# Patient Record
Sex: MALE | Race: WHITE | Employment: OTHER | URBAN - METROPOLITAN AREA
[De-identification: names, ages, dates, MRNs, and addresses within clinical notes are randomized per-mention and may not be internally consistent; named-entity substitution may affect disease eponyms.]

---

## 2017-01-06 RX ORDER — HYDROCODONE BITARTRATE AND ACETAMINOPHEN 5; 325 MG/1; MG/1
1 TABLET ORAL 2 TIMES DAILY
Qty: 60 TABLET | Refills: 0 | Status: SHIPPED | OUTPATIENT
Start: 2017-01-06 | End: 2017-02-10 | Stop reason: SDUPTHER

## 2017-02-09 ENCOUNTER — PATIENT MESSAGE (OUTPATIENT)
Dept: FAMILY MEDICINE CLINIC | Age: 57
End: 2017-02-09

## 2017-02-10 ENCOUNTER — TELEPHONE (OUTPATIENT)
Dept: FAMILY MEDICINE CLINIC | Age: 57
End: 2017-02-10

## 2017-02-10 RX ORDER — HYDROCODONE BITARTRATE AND ACETAMINOPHEN 5; 325 MG/1; MG/1
1 TABLET ORAL 2 TIMES DAILY
Qty: 60 TABLET | Refills: 0 | Status: SHIPPED | OUTPATIENT
Start: 2017-02-10 | End: 2017-03-28 | Stop reason: SDUPTHER

## 2017-03-28 RX ORDER — HYDROCODONE BITARTRATE AND ACETAMINOPHEN 5; 325 MG/1; MG/1
1 TABLET ORAL 2 TIMES DAILY
Qty: 60 TABLET | Refills: 0 | Status: SHIPPED | OUTPATIENT
Start: 2017-03-28 | End: 2017-06-05 | Stop reason: SDUPTHER

## 2017-06-05 ENCOUNTER — OFFICE VISIT (OUTPATIENT)
Dept: FAMILY MEDICINE CLINIC | Age: 57
End: 2017-06-05

## 2017-06-05 VITALS
BODY MASS INDEX: 30.38 KG/M2 | HEART RATE: 100 BPM | WEIGHT: 194 LBS | DIASTOLIC BLOOD PRESSURE: 82 MMHG | SYSTOLIC BLOOD PRESSURE: 150 MMHG

## 2017-06-05 DIAGNOSIS — I10 ESSENTIAL HYPERTENSION: Primary | ICD-10-CM

## 2017-06-05 DIAGNOSIS — M50.30 DDD (DEGENERATIVE DISC DISEASE), CERVICAL: ICD-10-CM

## 2017-06-05 DIAGNOSIS — M51.37 DDD (DEGENERATIVE DISC DISEASE), LUMBOSACRAL: ICD-10-CM

## 2017-06-05 PROCEDURE — 99214 OFFICE O/P EST MOD 30 MIN: CPT | Performed by: FAMILY MEDICINE

## 2017-06-05 RX ORDER — AMLODIPINE BESYLATE 10 MG/1
10 TABLET ORAL DAILY
Qty: 30 TABLET | Refills: 1 | Status: SHIPPED | OUTPATIENT
Start: 2017-06-05 | End: 2017-07-07 | Stop reason: SDUPTHER

## 2017-06-05 RX ORDER — HYDROCODONE BITARTRATE AND ACETAMINOPHEN 5; 325 MG/1; MG/1
1 TABLET ORAL 2 TIMES DAILY
Qty: 60 TABLET | Refills: 0 | Status: SHIPPED | OUTPATIENT
Start: 2017-06-05 | End: 2017-07-07 | Stop reason: SDUPTHER

## 2017-06-05 ASSESSMENT — ENCOUNTER SYMPTOMS
BACK PAIN: 1
GASTROINTESTINAL NEGATIVE: 1
ALLERGIC/IMMUNOLOGIC NEGATIVE: 1
EYES NEGATIVE: 1

## 2017-07-07 ENCOUNTER — OFFICE VISIT (OUTPATIENT)
Dept: FAMILY MEDICINE CLINIC | Age: 57
End: 2017-07-07

## 2017-07-07 VITALS
WEIGHT: 196.6 LBS | SYSTOLIC BLOOD PRESSURE: 130 MMHG | HEART RATE: 96 BPM | DIASTOLIC BLOOD PRESSURE: 72 MMHG | BODY MASS INDEX: 30.79 KG/M2

## 2017-07-07 DIAGNOSIS — M50.30 DDD (DEGENERATIVE DISC DISEASE), CERVICAL: ICD-10-CM

## 2017-07-07 DIAGNOSIS — M51.37 DDD (DEGENERATIVE DISC DISEASE), LUMBOSACRAL: ICD-10-CM

## 2017-07-07 DIAGNOSIS — I10 ESSENTIAL HYPERTENSION: ICD-10-CM

## 2017-07-07 PROCEDURE — 99213 OFFICE O/P EST LOW 20 MIN: CPT | Performed by: FAMILY MEDICINE

## 2017-07-07 RX ORDER — AMLODIPINE BESYLATE 10 MG/1
10 TABLET ORAL DAILY
Qty: 30 TABLET | Refills: 2 | Status: SHIPPED | OUTPATIENT
Start: 2017-07-07 | End: 2017-11-14 | Stop reason: SDUPTHER

## 2017-07-07 RX ORDER — HYDROCODONE BITARTRATE AND ACETAMINOPHEN 5; 325 MG/1; MG/1
1 TABLET ORAL 2 TIMES DAILY
Qty: 60 TABLET | Refills: 0 | Status: SHIPPED | OUTPATIENT
Start: 2017-07-07 | End: 2017-08-11 | Stop reason: SDUPTHER

## 2017-07-07 ASSESSMENT — PATIENT HEALTH QUESTIONNAIRE - PHQ9
1. LITTLE INTEREST OR PLEASURE IN DOING THINGS: 1
SUM OF ALL RESPONSES TO PHQ9 QUESTIONS 1 & 2: 1
2. FEELING DOWN, DEPRESSED OR HOPELESS: 0
SUM OF ALL RESPONSES TO PHQ QUESTIONS 1-9: 1

## 2017-07-07 ASSESSMENT — ENCOUNTER SYMPTOMS
BACK PAIN: 1
EYES NEGATIVE: 1
ALLERGIC/IMMUNOLOGIC NEGATIVE: 1
RESPIRATORY NEGATIVE: 1

## 2017-08-11 DIAGNOSIS — M51.37 DDD (DEGENERATIVE DISC DISEASE), LUMBOSACRAL: ICD-10-CM

## 2017-08-11 DIAGNOSIS — M50.30 DDD (DEGENERATIVE DISC DISEASE), CERVICAL: ICD-10-CM

## 2017-08-11 RX ORDER — HYDROCODONE BITARTRATE AND ACETAMINOPHEN 5; 325 MG/1; MG/1
1 TABLET ORAL 2 TIMES DAILY
Qty: 60 TABLET | Refills: 0 | Status: CANCELLED | OUTPATIENT
Start: 2017-08-11

## 2017-08-11 RX ORDER — HYDROCODONE BITARTRATE AND ACETAMINOPHEN 5; 325 MG/1; MG/1
1 TABLET ORAL 2 TIMES DAILY
Qty: 60 TABLET | Refills: 0 | Status: SHIPPED | OUTPATIENT
Start: 2017-08-11 | End: 2017-10-04 | Stop reason: SDUPTHER

## 2017-10-04 DIAGNOSIS — M50.30 DDD (DEGENERATIVE DISC DISEASE), CERVICAL: ICD-10-CM

## 2017-10-04 DIAGNOSIS — M51.37 DDD (DEGENERATIVE DISC DISEASE), LUMBOSACRAL: ICD-10-CM

## 2017-10-04 RX ORDER — HYDROCODONE BITARTRATE AND ACETAMINOPHEN 5; 325 MG/1; MG/1
1 TABLET ORAL 2 TIMES DAILY
Qty: 60 TABLET | Refills: 0 | Status: SHIPPED | OUTPATIENT
Start: 2017-10-04 | End: 2017-11-13 | Stop reason: SDUPTHER

## 2017-10-04 RX ORDER — HYDROCODONE BITARTRATE AND ACETAMINOPHEN 5; 325 MG/1; MG/1
1 TABLET ORAL 2 TIMES DAILY
Qty: 60 TABLET | Refills: 0 | OUTPATIENT
Start: 2017-10-04

## 2017-10-06 DIAGNOSIS — M51.37 DDD (DEGENERATIVE DISC DISEASE), LUMBOSACRAL: ICD-10-CM

## 2017-10-06 DIAGNOSIS — M50.30 DDD (DEGENERATIVE DISC DISEASE), CERVICAL: ICD-10-CM

## 2017-10-06 RX ORDER — HYDROCODONE BITARTRATE AND ACETAMINOPHEN 5; 325 MG/1; MG/1
1 TABLET ORAL 2 TIMES DAILY
Qty: 60 TABLET | Refills: 0 | OUTPATIENT
Start: 2017-10-06

## 2017-10-06 NOTE — TELEPHONE ENCOUNTER
From: Poornima Neville  To: Wendy Tate MD  Sent: 10/6/2017 10:42 AM EDT  Subject: Medication Renewal Request    Original authorizing provider: MD Gladis Kraft. Vannessa Alvarez would like a refill of the following medications:  HYDROcodone-acetaminophen ([address]) 5-325 MG per tablet Octavia Fuchs MD]    Preferred pharmacy: [address] [phone] - F [phone]    Comment:  I did not refill this Medication last month so why is it denied? I know I have an office visit on the 13th with Dr. Kalen Lovelace but I am out of my Medication now.

## 2017-10-13 ENCOUNTER — OFFICE VISIT (OUTPATIENT)
Dept: FAMILY MEDICINE CLINIC | Age: 57
End: 2017-10-13

## 2017-10-13 VITALS
BODY MASS INDEX: 30.98 KG/M2 | WEIGHT: 197.8 LBS | SYSTOLIC BLOOD PRESSURE: 134 MMHG | OXYGEN SATURATION: 99 % | DIASTOLIC BLOOD PRESSURE: 70 MMHG | HEART RATE: 96 BPM

## 2017-10-13 DIAGNOSIS — I10 ESSENTIAL HYPERTENSION: ICD-10-CM

## 2017-10-13 DIAGNOSIS — Z13.1 DIABETES MELLITUS SCREENING: ICD-10-CM

## 2017-10-13 DIAGNOSIS — G47.33 OBSTRUCTIVE SLEEP APNEA SYNDROME: ICD-10-CM

## 2017-10-13 DIAGNOSIS — I25.10 CORONARY ARTERY CALCIFICATION: ICD-10-CM

## 2017-10-13 DIAGNOSIS — E55.9 VITAMIN D DEFICIENCY: ICD-10-CM

## 2017-10-13 DIAGNOSIS — Z12.11 SCREENING FOR COLON CANCER: ICD-10-CM

## 2017-10-13 DIAGNOSIS — R73.9 HYPERGLYCEMIA: ICD-10-CM

## 2017-10-13 DIAGNOSIS — M50.30 DDD (DEGENERATIVE DISC DISEASE), CERVICAL: ICD-10-CM

## 2017-10-13 DIAGNOSIS — M45.7 ANKYLOSING SPONDYLITIS OF LUMBOSACRAL REGION (HCC): Primary | ICD-10-CM

## 2017-10-13 DIAGNOSIS — Z23 NEED FOR INFLUENZA VACCINATION: ICD-10-CM

## 2017-10-13 DIAGNOSIS — I25.84 CORONARY ARTERY CALCIFICATION: ICD-10-CM

## 2017-10-13 DIAGNOSIS — Z72.0 TOBACCO ABUSE: ICD-10-CM

## 2017-10-13 DIAGNOSIS — M51.37 DDD (DEGENERATIVE DISC DISEASE), LUMBOSACRAL: ICD-10-CM

## 2017-10-13 DIAGNOSIS — Z13.220 LIPID SCREENING: ICD-10-CM

## 2017-10-13 DIAGNOSIS — R63.5 WEIGHT GAIN: ICD-10-CM

## 2017-10-13 PROCEDURE — 4004F PT TOBACCO SCREEN RCVD TLK: CPT | Performed by: FAMILY MEDICINE

## 2017-10-13 PROCEDURE — G8417 CALC BMI ABV UP PARAM F/U: HCPCS | Performed by: FAMILY MEDICINE

## 2017-10-13 PROCEDURE — 3017F COLORECTAL CA SCREEN DOC REV: CPT | Performed by: FAMILY MEDICINE

## 2017-10-13 PROCEDURE — G8484 FLU IMMUNIZE NO ADMIN: HCPCS | Performed by: FAMILY MEDICINE

## 2017-10-13 PROCEDURE — G0008 ADMIN INFLUENZA VIRUS VAC: HCPCS | Performed by: FAMILY MEDICINE

## 2017-10-13 PROCEDURE — G8427 DOCREV CUR MEDS BY ELIG CLIN: HCPCS | Performed by: FAMILY MEDICINE

## 2017-10-13 PROCEDURE — 99214 OFFICE O/P EST MOD 30 MIN: CPT | Performed by: FAMILY MEDICINE

## 2017-10-13 PROCEDURE — 90630 INFLUENZA, QUADV, 18-64 YRS, ID, PF, MICRO INJ, 0.1ML (FLUZONE QUADV, PF): CPT | Performed by: FAMILY MEDICINE

## 2017-10-13 PROCEDURE — G8598 ASA/ANTIPLAT THER USED: HCPCS | Performed by: FAMILY MEDICINE

## 2017-10-13 ASSESSMENT — ENCOUNTER SYMPTOMS
EYES NEGATIVE: 1
ALLERGIC/IMMUNOLOGIC NEGATIVE: 1
APNEA: 1
BACK PAIN: 1

## 2017-10-13 NOTE — PROGRESS NOTES
and urgency. Musculoskeletal: Positive for back pain and neck pain. Chronic pain Sacroiliac Dx Ankylosis Spondylitis  MRI Lumbar & C spine 6- 2014  Orthopedic Dr Hazel Rodriguez for knee evaluation    Skin: Negative for rash. Allergic/Immunologic: Negative. Neurological: Positive for numbness. Negative for dizziness, light-headedness and headaches. Numbness RT LE if he stands too long   Hematological: Negative. Psychiatric/Behavioral: Negative for self-injury and suicidal ideas. Sleep 5-6 H good sleep       Objective:   Physical Exam   Constitutional: He is oriented to person, place, and time. He appears well-developed and well-nourished. No distress. overweight   HENT:   Head: Normocephalic and atraumatic. Right Ear: External ear normal.   Left Ear: External ear normal.   Nose: Nose normal.   Mouth/Throat: Oropharynx is clear and moist.   Eyes: Conjunctivae and EOM are normal. Pupils are equal, round, and reactive to light. Neck: Normal range of motion. Neck supple. No thyromegaly present. Cardiovascular: Normal rate, regular rhythm, normal heart sounds and intact distal pulses. No murmur heard. Pulmonary/Chest: Effort normal and breath sounds normal. He has no wheezes. He has no rales. He exhibits no tenderness. Abdominal: Soft. Bowel sounds are normal. He exhibits no distension and no mass. There is no tenderness. There is no rebound and no guarding. Musculoskeletal: He exhibits tenderness. He exhibits no edema. Lumbar area  straight leg 90 degree bilateral   Neurological: He is alert and oriented to person, place, and time. He has normal reflexes. No cranial nerve deficit. He exhibits normal muscle tone. Coordination normal.   DTR 1/4 equal bilateral   Skin: Skin is warm and dry. No rash noted. He is not diaphoretic. No erythema. Psychiatric: He has a normal mood and affect. His behavior is normal. Judgment and thought content normal.   Vitals reviewed.   BP 134/70   Pulse 96   Wt 197 lb 12.8 oz (89.7 kg)   SpO2 99%   BMI 30.98 kg/m²       Assessment:      1. Ankylosing spondylitis of lumbosacral region ([address]     2. Screening for colon cancer  POCT FECAL IMMUNOCHEMICAL TEST (FIT)   3. Essential hypertension     4. Tobacco abuse     5. Obstructive sleep apnea syndrome     6.  Vitamin D deficiency             Plan:      Hypertension Satisfactory & continue same Rx due for lab up date order given  Sleep apnea +CPAP due for up date get evaluation with  Dr Diogo Dewitt  Chronic back pain Rx Monitor reviewed today/OARS  Urine screen  Advice about Tobacco cessation & I did discuss again CT lung screen finding done   CT lung  +Coronary  calcification ,mild bronchiectasis + band like opacity RML & LLL  3 mm YAIR ,mild Emphysema & scattered colonic diverticula  Problem list up date done  Due for colonoscopy but he declined ,he will get FIT test  Flu shot

## 2017-10-24 LAB
A/G RATIO: 1.3 (CALC) (ref 1–2.5)
ALBUMIN SERPL-MCNC: 4.4 G/DL (ref 3.6–5.1)
ALP BLD-CCNC: 125 U/L (ref 40–115)
ALT SERPL-CCNC: 25 U/L (ref 9–46)
AST SERPL-CCNC: 24 U/L (ref 10–35)
BASOPHILS ABSOLUTE: 123 CELLS/UL (ref 0–200)
BASOPHILS RELATIVE PERCENT: 1.6 %
BILIRUB SERPL-MCNC: 0.7 MG/DL (ref 0.2–1.2)
BUN / CREAT RATIO: ABNORMAL (CALC) (ref 6–22)
BUN BLDV-MCNC: 10 MG/DL (ref 7–25)
CALCIUM SERPL-MCNC: 9.8 MG/DL (ref 8.6–10.3)
CHLORIDE BLD-SCNC: 99 MMOL/L (ref 98–110)
CHOLESTEROL: 164 MG/DL (CALC)
CO2: 25 MMOL/L (ref 20–31)
CREAT SERPL-MCNC: 0.87 MG/DL (ref 0.7–1.33)
EOSINOPHILS ABSOLUTE: 300 CELLS/UL (ref 15–500)
EOSINOPHILS RELATIVE PERCENT: 3.9 %
GFR AFRICAN AMERICAN: 111 ML/MIN/1.73M2
GFR SERPL CREATININE-BSD FRML MDRD: 96 ML/MIN/1.73M2
GLOBULIN: 3.5 G/DL (CALC) (ref 1.9–3.7)
GLUCOSE BLD-MCNC: 111 MG/DL (ref 65–99)
HBA1C MFR BLD: 5.9 % OF TOTAL HGB
HCT VFR BLD CALC: 45.3 % (ref 38.5–50)
HEMOGLOBIN: 15.5 G/DL (ref 13.2–17.1)
LYMPHOCYTES ABSOLUTE: 1987 CELLS/UL (ref 850–3900)
LYMPHOCYTES RELATIVE PERCENT: 25.8 %
MCH RBC QN AUTO: 28 PG (ref 27–33)
MCHC RBC AUTO-ENTMCNC: 34.2 G/DL (ref 32–36)
MCV RBC AUTO: 81.8 FL (ref 80–100)
MONOCYTES ABSOLUTE: 485 CELLS/UL (ref 200–950)
MONOCYTES RELATIVE PERCENT: 6.3 %
NEUTROPHILS ABSOLUTE: 4805 CELLS/UL (ref 1500–7800)
PDW BLD-RTO: 12.8 % (ref 11–15)
PLATELET # BLD: 325 THOUSAND/UL (ref 140–400)
PMV BLD AUTO: 8.6 FL (ref 7.5–12.5)
POTASSIUM SERPL-SCNC: 4.3 MMOL/L (ref 3.5–5.3)
RBC # BLD: 5.54 MILLION/UL (ref 4.2–5.8)
SEGMENTED NEUTROPHILS RELATIVE PERCENT: 62.4 %
SODIUM BLD-SCNC: 135 MMOL/L (ref 135–146)
TOTAL PROTEIN: 7.9 G/DL (ref 6.1–8.1)
TSH ULTRASENSITIVE: 1.11 MIU/L (ref 0.4–4.5)
VITAMIN D 25-HYDROXY: 23 NG/ML (ref 30–100)
WBC # BLD: 7.7 THOUSAND/UL (ref 3.8–10.8)

## 2017-11-13 DIAGNOSIS — M50.30 DDD (DEGENERATIVE DISC DISEASE), CERVICAL: ICD-10-CM

## 2017-11-13 DIAGNOSIS — M51.37 DDD (DEGENERATIVE DISC DISEASE), LUMBOSACRAL: ICD-10-CM

## 2017-11-13 RX ORDER — HYDROCODONE BITARTRATE AND ACETAMINOPHEN 5; 325 MG/1; MG/1
1 TABLET ORAL 2 TIMES DAILY
Qty: 60 TABLET | Refills: 0 | Status: SHIPPED | OUTPATIENT
Start: 2017-11-13 | End: 2017-12-13 | Stop reason: SDUPTHER

## 2017-11-13 NOTE — TELEPHONE ENCOUNTER
From: Ivet Elizabeth  Sent: 11/13/2017 8:57 AM EST  Subject: Medication Renewal Request    Greg Hurd 140 would like a refill of the following medications:  HYDROcodone-acetaminophen ([address]) 5-325 MG per tablet Stephen Christianson MD]    Preferred pharmacy: [address] [phone] Milly Addison [phone]    Comment:

## 2017-11-14 DIAGNOSIS — I10 ESSENTIAL HYPERTENSION: ICD-10-CM

## 2017-11-14 NOTE — TELEPHONE ENCOUNTER
From: Salma Houston  Sent: 11/13/2017 5:51 PM EST  Subject: Medication Renewal Request    Greg ALMAGrayson Chavezchiaragen 140 would like a refill of the following medications:  amLODIPine (NORVASC) 10 MG tablet Annabelle Bailey MD]    Preferred pharmacy: [place] [id number] , [address] [place] [address] [phone] [facility] [phone]    Comment:

## 2017-11-15 RX ORDER — AMLODIPINE BESYLATE 10 MG/1
10 TABLET ORAL DAILY
Qty: 30 TABLET | Refills: 5 | Status: SHIPPED | OUTPATIENT
Start: 2017-11-15 | End: 2017-12-13 | Stop reason: SDUPTHER

## 2017-12-13 DIAGNOSIS — I10 ESSENTIAL HYPERTENSION: ICD-10-CM

## 2017-12-13 DIAGNOSIS — M50.30 DDD (DEGENERATIVE DISC DISEASE), CERVICAL: ICD-10-CM

## 2017-12-13 DIAGNOSIS — M51.37 DDD (DEGENERATIVE DISC DISEASE), LUMBOSACRAL: ICD-10-CM

## 2017-12-13 RX ORDER — AMLODIPINE BESYLATE 10 MG/1
10 TABLET ORAL DAILY
Qty: 30 TABLET | Refills: 5 | Status: SHIPPED | OUTPATIENT
Start: 2017-12-13 | End: 2018-01-12

## 2017-12-13 RX ORDER — HYDROCODONE BITARTRATE AND ACETAMINOPHEN 5; 325 MG/1; MG/1
1 TABLET ORAL 2 TIMES DAILY
Qty: 60 TABLET | Refills: 0 | Status: SHIPPED | OUTPATIENT
Start: 2017-12-13 | End: 2018-01-12 | Stop reason: SDUPTHER

## 2017-12-13 NOTE — TELEPHONE ENCOUNTER
From: Cathy Duckworth  Sent: 12/13/2017 6:57 AM EST  Subject: Medication Renewal Request    Greg BLACK  Ruddyranda Alvarez would like a refill of the following medications:  HYDROcodone-acetaminophen ([address]) 5-325 MG per tablet Ivory Valencia MD]  amLODIPine (NORVASC) 10 MG tablet Ivory Valencia MD]    [address] [phone] Adriana Gray [phone]    Comment:

## 2018-01-12 ENCOUNTER — OFFICE VISIT (OUTPATIENT)
Dept: FAMILY MEDICINE CLINIC | Age: 58
End: 2018-01-12

## 2018-01-12 VITALS
BODY MASS INDEX: 31.08 KG/M2 | OXYGEN SATURATION: 98 % | DIASTOLIC BLOOD PRESSURE: 104 MMHG | HEIGHT: 67 IN | HEART RATE: 117 BPM | WEIGHT: 198 LBS | SYSTOLIC BLOOD PRESSURE: 150 MMHG

## 2018-01-12 DIAGNOSIS — Z72.0 TOBACCO ABUSE: ICD-10-CM

## 2018-01-12 DIAGNOSIS — G89.29 CHRONIC BILATERAL LOW BACK PAIN WITHOUT SCIATICA: ICD-10-CM

## 2018-01-12 DIAGNOSIS — M50.30 DDD (DEGENERATIVE DISC DISEASE), CERVICAL: ICD-10-CM

## 2018-01-12 DIAGNOSIS — M51.37 DDD (DEGENERATIVE DISC DISEASE), LUMBOSACRAL: ICD-10-CM

## 2018-01-12 DIAGNOSIS — M54.50 CHRONIC BILATERAL LOW BACK PAIN WITHOUT SCIATICA: ICD-10-CM

## 2018-01-12 DIAGNOSIS — I10 ESSENTIAL HYPERTENSION: Primary | ICD-10-CM

## 2018-01-12 PROCEDURE — G8484 FLU IMMUNIZE NO ADMIN: HCPCS | Performed by: FAMILY MEDICINE

## 2018-01-12 PROCEDURE — 3017F COLORECTAL CA SCREEN DOC REV: CPT | Performed by: FAMILY MEDICINE

## 2018-01-12 PROCEDURE — 99214 OFFICE O/P EST MOD 30 MIN: CPT | Performed by: FAMILY MEDICINE

## 2018-01-12 PROCEDURE — G8598 ASA/ANTIPLAT THER USED: HCPCS | Performed by: FAMILY MEDICINE

## 2018-01-12 PROCEDURE — G8417 CALC BMI ABV UP PARAM F/U: HCPCS | Performed by: FAMILY MEDICINE

## 2018-01-12 PROCEDURE — 4004F PT TOBACCO SCREEN RCVD TLK: CPT | Performed by: FAMILY MEDICINE

## 2018-01-12 PROCEDURE — G8427 DOCREV CUR MEDS BY ELIG CLIN: HCPCS | Performed by: FAMILY MEDICINE

## 2018-01-12 RX ORDER — HYDROCODONE BITARTRATE AND ACETAMINOPHEN 5; 325 MG/1; MG/1
1 TABLET ORAL 2 TIMES DAILY
Qty: 60 TABLET | Refills: 0 | Status: SHIPPED | OUTPATIENT
Start: 2018-01-12 | End: 2018-02-16 | Stop reason: SDUPTHER

## 2018-01-12 RX ORDER — AMLODIPINE AND VALSARTAN 5; 160 MG/1; MG/1
1 TABLET ORAL DAILY
Qty: 30 TABLET | Refills: 3 | Status: SHIPPED | OUTPATIENT
Start: 2018-01-12 | End: 2018-07-16

## 2018-01-12 ASSESSMENT — ENCOUNTER SYMPTOMS
EYES NEGATIVE: 1
RESPIRATORY NEGATIVE: 1
ALLERGIC/IMMUNOLOGIC NEGATIVE: 1
SHORTNESS OF BREATH: 0
BACK PAIN: 1
CHEST TIGHTNESS: 0

## 2018-01-12 NOTE — PROGRESS NOTES
HYDROcodone-acetaminophen (NORCO) 5-325 MG per tablet   4. DDD (degenerative disc disease), cervical  HYDROcodone-acetaminophen (NORCO) 5-325 MG per tablet   5.  Chronic bilateral low back pain without sciatica             Plan:       Hypertension D/C Norvasc New Rx   Diet improved Fruit & Vegetable  Advice tobacco cessation    OARRS report & review

## 2018-01-29 ENCOUNTER — NURSE ONLY (OUTPATIENT)
Dept: FAMILY MEDICINE CLINIC | Age: 58
End: 2018-01-29

## 2018-01-29 DIAGNOSIS — Z12.11 SCREENING FOR COLON CANCER: ICD-10-CM

## 2018-01-29 LAB
CONTROL: POSITIVE
HEMOCCULT STL QL: POSITIVE

## 2018-01-29 PROCEDURE — 82274 ASSAY TEST FOR BLOOD FECAL: CPT | Performed by: FAMILY MEDICINE

## 2018-01-31 ENCOUNTER — TELEPHONE (OUTPATIENT)
Dept: FAMILY MEDICINE CLINIC | Age: 58
End: 2018-01-31

## 2018-01-31 DIAGNOSIS — R19.5 POSITIVE FIT (FECAL IMMUNOCHEMICAL TEST): Primary | ICD-10-CM

## 2018-02-16 ENCOUNTER — TELEPHONE (OUTPATIENT)
Dept: FAMILY MEDICINE CLINIC | Age: 58
End: 2018-02-16

## 2018-02-16 DIAGNOSIS — M51.37 DDD (DEGENERATIVE DISC DISEASE), LUMBOSACRAL: ICD-10-CM

## 2018-02-16 DIAGNOSIS — M50.30 DDD (DEGENERATIVE DISC DISEASE), CERVICAL: ICD-10-CM

## 2018-02-16 RX ORDER — HYDROCODONE BITARTRATE AND ACETAMINOPHEN 5; 325 MG/1; MG/1
1 TABLET ORAL 2 TIMES DAILY
Qty: 60 TABLET | Refills: 0 | Status: SHIPPED | OUTPATIENT
Start: 2018-02-16 | End: 2018-03-16 | Stop reason: SDUPTHER

## 2018-03-12 PROBLEM — D36.9 ADENOMATOUS POLYP: Status: ACTIVE | Noted: 2018-02-22

## 2018-03-16 DIAGNOSIS — M50.30 DDD (DEGENERATIVE DISC DISEASE), CERVICAL: ICD-10-CM

## 2018-03-16 DIAGNOSIS — M51.37 DDD (DEGENERATIVE DISC DISEASE), LUMBOSACRAL: ICD-10-CM

## 2018-03-16 RX ORDER — HYDROCODONE BITARTRATE AND ACETAMINOPHEN 5; 325 MG/1; MG/1
1 TABLET ORAL 2 TIMES DAILY
Qty: 60 TABLET | Refills: 0 | Status: SHIPPED | OUTPATIENT
Start: 2018-03-16 | End: 2018-04-16 | Stop reason: SDUPTHER

## 2018-03-16 NOTE — TELEPHONE ENCOUNTER
From: Jeri Aguayo  Sent: 3/16/2018 10:38 AM EDT  Subject: Medication Renewal Request    Greg WAYNE  Kathychiaragen Antonio would like a refill of the following medications:     HYDROcodone-acetaminophen ([address]) 5-325 MG per tablet Liborio Carrel, MD]    Preferred pharmacy: [facility] [address], [address] [phone] Darci Felder [phone]    Comment:

## 2018-04-16 ENCOUNTER — OFFICE VISIT (OUTPATIENT)
Dept: FAMILY MEDICINE CLINIC | Age: 58
End: 2018-04-16

## 2018-04-16 VITALS
DIASTOLIC BLOOD PRESSURE: 80 MMHG | HEART RATE: 105 BPM | OXYGEN SATURATION: 99 % | WEIGHT: 200 LBS | BODY MASS INDEX: 31.32 KG/M2 | SYSTOLIC BLOOD PRESSURE: 150 MMHG

## 2018-04-16 DIAGNOSIS — I10 ESSENTIAL HYPERTENSION: Primary | ICD-10-CM

## 2018-04-16 DIAGNOSIS — N40.0 BENIGN PROSTATIC HYPERPLASIA WITHOUT LOWER URINARY TRACT SYMPTOMS: ICD-10-CM

## 2018-04-16 DIAGNOSIS — Z72.0 TOBACCO ABUSE: ICD-10-CM

## 2018-04-16 DIAGNOSIS — R73.09 HIGH GLUCOSE: ICD-10-CM

## 2018-04-16 DIAGNOSIS — Z11.59 ENCOUNTER FOR HCV SCREENING TEST FOR LOW RISK PATIENT: ICD-10-CM

## 2018-04-16 DIAGNOSIS — G89.29 CHRONIC BILATERAL LOW BACK PAIN WITHOUT SCIATICA: ICD-10-CM

## 2018-04-16 DIAGNOSIS — M51.37 DDD (DEGENERATIVE DISC DISEASE), LUMBOSACRAL: ICD-10-CM

## 2018-04-16 DIAGNOSIS — M50.30 DDD (DEGENERATIVE DISC DISEASE), CERVICAL: ICD-10-CM

## 2018-04-16 DIAGNOSIS — M54.50 CHRONIC BILATERAL LOW BACK PAIN WITHOUT SCIATICA: ICD-10-CM

## 2018-04-16 DIAGNOSIS — E78.9 LIPID DISORDER: ICD-10-CM

## 2018-04-16 PROCEDURE — G8598 ASA/ANTIPLAT THER USED: HCPCS | Performed by: FAMILY MEDICINE

## 2018-04-16 PROCEDURE — 3017F COLORECTAL CA SCREEN DOC REV: CPT | Performed by: FAMILY MEDICINE

## 2018-04-16 PROCEDURE — 99214 OFFICE O/P EST MOD 30 MIN: CPT | Performed by: FAMILY MEDICINE

## 2018-04-16 PROCEDURE — G8417 CALC BMI ABV UP PARAM F/U: HCPCS | Performed by: FAMILY MEDICINE

## 2018-04-16 PROCEDURE — G8427 DOCREV CUR MEDS BY ELIG CLIN: HCPCS | Performed by: FAMILY MEDICINE

## 2018-04-16 PROCEDURE — 4004F PT TOBACCO SCREEN RCVD TLK: CPT | Performed by: FAMILY MEDICINE

## 2018-04-16 RX ORDER — AMLODIPINE, VALSARTAN AND HYDROCHLOROTHIAZIDE 10; 160; 12.5 MG/1; MG/1; MG/1
TABLET ORAL
Qty: 90 TABLET | Refills: 1 | Status: SHIPPED | OUTPATIENT
Start: 2018-04-16 | End: 2018-10-17 | Stop reason: SDUPTHER

## 2018-04-16 RX ORDER — HYDROCODONE BITARTRATE AND ACETAMINOPHEN 5; 325 MG/1; MG/1
1 TABLET ORAL 2 TIMES DAILY
Qty: 60 TABLET | Refills: 0 | Status: SHIPPED | OUTPATIENT
Start: 2018-04-16 | End: 2018-05-22 | Stop reason: SDUPTHER

## 2018-04-16 ASSESSMENT — ENCOUNTER SYMPTOMS
EYES NEGATIVE: 1
BACK PAIN: 1
APNEA: 1
ALLERGIC/IMMUNOLOGIC NEGATIVE: 1

## 2018-05-22 ENCOUNTER — PATIENT MESSAGE (OUTPATIENT)
Dept: FAMILY MEDICINE CLINIC | Age: 58
End: 2018-05-22

## 2018-05-22 DIAGNOSIS — M50.30 DDD (DEGENERATIVE DISC DISEASE), CERVICAL: ICD-10-CM

## 2018-05-22 DIAGNOSIS — M51.37 DDD (DEGENERATIVE DISC DISEASE), LUMBOSACRAL: ICD-10-CM

## 2018-05-22 DIAGNOSIS — G89.29 CHRONIC BILATERAL LOW BACK PAIN WITHOUT SCIATICA: ICD-10-CM

## 2018-05-22 DIAGNOSIS — M54.50 CHRONIC BILATERAL LOW BACK PAIN WITHOUT SCIATICA: ICD-10-CM

## 2018-05-23 RX ORDER — HYDROCODONE BITARTRATE AND ACETAMINOPHEN 5; 325 MG/1; MG/1
1 TABLET ORAL 2 TIMES DAILY
Qty: 60 TABLET | Refills: 0 | Status: SHIPPED | OUTPATIENT
Start: 2018-05-23 | End: 2018-07-06 | Stop reason: SDUPTHER

## 2018-07-06 DIAGNOSIS — M50.30 DDD (DEGENERATIVE DISC DISEASE), CERVICAL: ICD-10-CM

## 2018-07-06 DIAGNOSIS — M54.50 CHRONIC BILATERAL LOW BACK PAIN WITHOUT SCIATICA: ICD-10-CM

## 2018-07-06 DIAGNOSIS — M51.37 DDD (DEGENERATIVE DISC DISEASE), LUMBOSACRAL: ICD-10-CM

## 2018-07-06 DIAGNOSIS — G89.29 CHRONIC BILATERAL LOW BACK PAIN WITHOUT SCIATICA: ICD-10-CM

## 2018-07-06 RX ORDER — HYDROCODONE BITARTRATE AND ACETAMINOPHEN 5; 325 MG/1; MG/1
1 TABLET ORAL 2 TIMES DAILY
Qty: 60 TABLET | Refills: 0 | Status: SHIPPED | OUTPATIENT
Start: 2018-07-06 | End: 2018-08-24 | Stop reason: SDUPTHER

## 2018-07-16 ENCOUNTER — OFFICE VISIT (OUTPATIENT)
Dept: FAMILY MEDICINE CLINIC | Age: 58
End: 2018-07-16

## 2018-07-16 VITALS
BODY MASS INDEX: 30.82 KG/M2 | HEART RATE: 107 BPM | SYSTOLIC BLOOD PRESSURE: 136 MMHG | DIASTOLIC BLOOD PRESSURE: 78 MMHG | OXYGEN SATURATION: 98 % | WEIGHT: 196.8 LBS

## 2018-07-16 DIAGNOSIS — I10 ESSENTIAL HYPERTENSION: Primary | ICD-10-CM

## 2018-07-16 DIAGNOSIS — J40 BRONCHITIS: ICD-10-CM

## 2018-07-16 DIAGNOSIS — G89.29 CHRONIC BILATERAL LOW BACK PAIN WITHOUT SCIATICA: ICD-10-CM

## 2018-07-16 DIAGNOSIS — M54.50 CHRONIC BILATERAL LOW BACK PAIN WITHOUT SCIATICA: ICD-10-CM

## 2018-07-16 DIAGNOSIS — Z72.0 TOBACCO ABUSE: ICD-10-CM

## 2018-07-16 DIAGNOSIS — M50.30 DDD (DEGENERATIVE DISC DISEASE), CERVICAL: ICD-10-CM

## 2018-07-16 PROCEDURE — 3017F COLORECTAL CA SCREEN DOC REV: CPT | Performed by: FAMILY MEDICINE

## 2018-07-16 PROCEDURE — G8598 ASA/ANTIPLAT THER USED: HCPCS | Performed by: FAMILY MEDICINE

## 2018-07-16 PROCEDURE — 4004F PT TOBACCO SCREEN RCVD TLK: CPT | Performed by: FAMILY MEDICINE

## 2018-07-16 PROCEDURE — G8417 CALC BMI ABV UP PARAM F/U: HCPCS | Performed by: FAMILY MEDICINE

## 2018-07-16 PROCEDURE — 99214 OFFICE O/P EST MOD 30 MIN: CPT | Performed by: FAMILY MEDICINE

## 2018-07-16 PROCEDURE — G8427 DOCREV CUR MEDS BY ELIG CLIN: HCPCS | Performed by: FAMILY MEDICINE

## 2018-07-16 ASSESSMENT — ENCOUNTER SYMPTOMS
PHOTOPHOBIA: 0
ABDOMINAL DISTENTION: 0
EYE ITCHING: 0
VOMITING: 0
CHEST TIGHTNESS: 0
BLOOD IN STOOL: 0
CHOKING: 0
COUGH: 0
EYE DISCHARGE: 0
SINUS PAIN: 0
EYE REDNESS: 0
EYE PAIN: 0
SORE THROAT: 0
VOICE CHANGE: 0
SINUS PRESSURE: 0
WHEEZING: 0
ABDOMINAL PAIN: 0
BACK PAIN: 1
CONSTIPATION: 0
RHINORRHEA: 0
SHORTNESS OF BREATH: 0
DIARRHEA: 0
COLOR CHANGE: 0
NAUSEA: 0
TROUBLE SWALLOWING: 0
APNEA: 0

## 2018-07-16 ASSESSMENT — PATIENT HEALTH QUESTIONNAIRE - PHQ9
1. LITTLE INTEREST OR PLEASURE IN DOING THINGS: 0
SUM OF ALL RESPONSES TO PHQ QUESTIONS 1-9: 0
SUM OF ALL RESPONSES TO PHQ9 QUESTIONS 1 & 2: 0

## 2018-07-16 NOTE — PROGRESS NOTES
SUBJECTIVE:  Patient ID: Ravi Gonzalez is a 62 y.o. male. Chief Complaint:  Chief Complaint   Patient presents with    3 Month Follow-Up       HPI   62 y old male   Active with house chore   [address] friends & neighbor   3 Kids & 3 G kids  Chronic pain he is managing with Pain Rx  Hypertension compliant no side effect  Tobacco use Enjoy it a lot   Past Medical History:   Diagnosis Date    CVA (cerebral infarction)     hosp x 2 days [address]    Hypertension     Sleep apnea    No past surgical history on file. Past Medical History:   Diagnosis Date    CVA (cerebral infarction)     hosp x 2 days [address]    Hypertension     Sleep apnea          Patient Active Problem List   Diagnosis    History of CVA (cerebrovascular accident)    Sleep apnea    Ankylosing spondylitis ([address]    Hypertension    Tobacco abuse    DDD (degenerative disc disease), cervical    DDD (degenerative disc disease), lumbosacral    Sacroiliac joint dysfunction    Chronic back pain    Family history of coronary artery disease    Vitamin D deficiency    Diverticulosis of intestine without bleeding    Coronary artery calcification    Emphysema/COPD (HCC)    Adenomatous polyp     Current Outpatient Prescriptions   Medication Sig Dispense Refill    HYDROcodone-acetaminophen (NORCO) 5-325 MG per tablet Take 1 tablet by mouth 2 times daily for 30 days. . 60 tablet 0    vitamin D (ERGOCALCIFEROL) 95833 UNITS CAPS capsule Take 1 capsule by mouth once a week 12 capsule 1    aspirin 325 MG tablet Take 325 mg by mouth daily.  amLODIPine-valsartan-HCTZ (EXFORGE HCT) -12.5 MG TABS One tablet qd 90 tablet 1    amLODIPine-valsartan (EXFORGE) 5-160 MG per tablet Take 1 tablet by mouth daily 30 tablet 3     No current facility-administered medications for this visit.       Lab Results   Component Value Date    WBC 7.7 10/23/2017    HGB 15.5 10/23/2017    HCT 45.3 10/23/2017    MCV 81.8 10/23/2017     10/23/2017     Lab Results   Component Value Date    CHOL 164 (H) 10/23/2017    CHOL 174 10/24/2016    CHOL 130 10/24/2016     Lab Results   Component Value Date    TRIG 79 10/24/2016     Lab Results   Component Value Date    HDL 44 10/24/2016     Lab Results   Component Value Date    LDLCALC 114 10/24/2016     No results found for: LABVLDL, VLDL  Lab Results   Component Value Date    CHOLHDLRATIO 4.0 10/24/2016       Chemistry        Component Value Date/Time     10/23/2017 1005    K 4.3 10/23/2017 1005    CL 99 10/23/2017 1005    CO2 25 10/23/2017 1005    BUN 10 10/23/2017 1005    CREATININE 0.87 10/23/2017 1005        Component Value Date/Time    CALCIUM 9.8 10/23/2017 1005    ALKPHOS 125 (H) 10/23/2017 1005    AST 24 10/23/2017 1005    ALT 25 10/23/2017 1005    BILITOT 0.7 10/23/2017 1005            Review of Systems   Constitutional: Negative for activity change, appetite change, chills, diaphoresis, fatigue, fever and unexpected weight change. HENT: Negative for congestion, ear discharge, ear pain, hearing loss, nosebleeds, postnasal drip, rhinorrhea, sinus pain, sinus pressure, sore throat, tinnitus, trouble swallowing and voice change. Eyes: Negative for photophobia, pain, discharge, redness, itching and visual disturbance. Respiratory: Negative for apnea, cough, choking, chest tightness, shortness of breath and wheezing. CPAP  Tobacco use   Cardiovascular: Negative for chest pain, palpitations and leg swelling. Gastrointestinal: Negative for abdominal distention, abdominal pain, blood in stool, constipation, diarrhea, nausea and vomiting. BM regular   Endocrine: Negative for cold intolerance, heat intolerance, polydipsia, polyphagia and polyuria. Genitourinary: Negative for dysuria and frequency. Musculoskeletal: Positive for back pain and neck pain. Negative for gait problem. Skin: Negative for color change and rash.    Allergic/Immunologic: Negative for environmental allergies and food allergies. Neurological: Negative for dizziness, tremors, light-headedness, numbness and headaches. Psychiatric/Behavioral: Negative for agitation, behavioral problems, decreased concentration and sleep disturbance. The patient is not hyperactive. OBJECTIVE:  /78   Pulse 107   Wt 196 lb 12.8 oz (89.3 kg)   SpO2 98%   BMI 30.82 kg/m²   Physical Exam   Constitutional: He is oriented to person, place, and time. He appears well-developed and well-nourished. HENT:   Head: Normocephalic. Right Ear: External ear normal.   Left Ear: External ear normal.   Nose: Nose normal.   Mouth/Throat: Oropharynx is clear and moist. No oropharyngeal exudate. Eyes: Conjunctivae and EOM are normal. Pupils are equal, round, and reactive to light. Neck: Normal range of motion. Neck supple. Cardiovascular: Normal rate, regular rhythm and normal heart sounds. No murmur heard. Pulmonary/Chest: Effort normal.   Abdominal: Soft. Bowel sounds are normal.   Musculoskeletal: He exhibits no edema. Neurological: He is alert and oriented to person, place, and time. He has normal reflexes. Skin: Skin is warm. Psychiatric: He has a normal mood and affect. His behavior is normal. Judgment and thought content normal.   Vitals reviewed. ASSESSMENT/PLAN:     Diagnosis Orders   1. Essential hypertension     2. DDD (degenerative disc disease), cervical     3. Tobacco abuse  CT Chest WO Contrast   4. Chronic bilateral low back pain without sciatica     5.  Bronchitis  CT Chest WO Contrast     OARRS check & Review  Discuss Tobacco cessation ,aware of CT chest finding 2016 mild emphysema

## 2018-08-24 DIAGNOSIS — M54.50 CHRONIC BILATERAL LOW BACK PAIN WITHOUT SCIATICA: ICD-10-CM

## 2018-08-24 DIAGNOSIS — G89.29 CHRONIC BILATERAL LOW BACK PAIN WITHOUT SCIATICA: ICD-10-CM

## 2018-08-24 DIAGNOSIS — M50.30 DDD (DEGENERATIVE DISC DISEASE), CERVICAL: ICD-10-CM

## 2018-08-24 DIAGNOSIS — M51.37 DDD (DEGENERATIVE DISC DISEASE), LUMBOSACRAL: ICD-10-CM

## 2018-08-24 RX ORDER — HYDROCODONE BITARTRATE AND ACETAMINOPHEN 5; 325 MG/1; MG/1
1 TABLET ORAL 2 TIMES DAILY
Qty: 60 TABLET | Refills: 0 | OUTPATIENT
Start: 2018-08-24 | End: 2018-09-23

## 2018-08-24 RX ORDER — HYDROCODONE BITARTRATE AND ACETAMINOPHEN 5; 325 MG/1; MG/1
1 TABLET ORAL 2 TIMES DAILY
Qty: 60 TABLET | Refills: 0 | Status: SHIPPED | OUTPATIENT
Start: 2018-08-24 | End: 2018-10-17 | Stop reason: SDUPTHER

## 2018-10-11 ENCOUNTER — HOSPITAL ENCOUNTER (OUTPATIENT)
Dept: CT IMAGING | Age: 58
Discharge: HOME | End: 2018-10-11
Payer: COMMERCIAL

## 2018-10-11 DIAGNOSIS — Z72.0 TOBACCO ABUSE: ICD-10-CM

## 2018-10-11 DIAGNOSIS — J40 BRONCHITIS, NOT SPECIFIED AS ACUTE OR CHRONIC: ICD-10-CM

## 2018-10-11 PROCEDURE — 71250 CT THORAX DX C-: CPT

## 2018-10-17 ENCOUNTER — OFFICE VISIT (OUTPATIENT)
Dept: FAMILY MEDICINE CLINIC | Age: 58
End: 2018-10-17
Payer: COMMERCIAL

## 2018-10-17 VITALS
BODY MASS INDEX: 31.01 KG/M2 | HEART RATE: 109 BPM | OXYGEN SATURATION: 98 % | WEIGHT: 198 LBS | DIASTOLIC BLOOD PRESSURE: 80 MMHG | SYSTOLIC BLOOD PRESSURE: 136 MMHG

## 2018-10-17 DIAGNOSIS — I70.90 ATHEROSCLEROSIS: ICD-10-CM

## 2018-10-17 DIAGNOSIS — M54.50 CHRONIC BILATERAL LOW BACK PAIN WITHOUT SCIATICA: ICD-10-CM

## 2018-10-17 DIAGNOSIS — G47.33 OBSTRUCTIVE SLEEP APNEA SYNDROME: ICD-10-CM

## 2018-10-17 DIAGNOSIS — M45.7 ANKYLOSING SPONDYLITIS OF LUMBOSACRAL REGION (HCC): Primary | ICD-10-CM

## 2018-10-17 DIAGNOSIS — R91.8 LUNG NODULE, MULTIPLE: ICD-10-CM

## 2018-10-17 DIAGNOSIS — R73.09 HIGH GLUCOSE: ICD-10-CM

## 2018-10-17 DIAGNOSIS — Z02.83 ENCOUNTER FOR DRUG SCREENING: ICD-10-CM

## 2018-10-17 DIAGNOSIS — Z23 NEED FOR INFLUENZA VACCINATION: ICD-10-CM

## 2018-10-17 DIAGNOSIS — N42.9 PROSTATE DISORDER: ICD-10-CM

## 2018-10-17 DIAGNOSIS — M51.37 DDD (DEGENERATIVE DISC DISEASE), LUMBOSACRAL: ICD-10-CM

## 2018-10-17 DIAGNOSIS — I10 ESSENTIAL HYPERTENSION: ICD-10-CM

## 2018-10-17 DIAGNOSIS — M50.30 DDD (DEGENERATIVE DISC DISEASE), CERVICAL: ICD-10-CM

## 2018-10-17 DIAGNOSIS — Z72.0 TOBACCO ABUSE: ICD-10-CM

## 2018-10-17 DIAGNOSIS — G89.29 CHRONIC BILATERAL LOW BACK PAIN WITHOUT SCIATICA: ICD-10-CM

## 2018-10-17 LAB
A/G RATIO: 1.8 (ref 1.1–2.2)
ALBUMIN SERPL-MCNC: 5.1 G/DL (ref 3.4–5)
ALP BLD-CCNC: 119 U/L (ref 40–129)
ALT SERPL-CCNC: 22 U/L (ref 10–40)
ANION GAP SERPL CALCULATED.3IONS-SCNC: 17 MMOL/L (ref 3–16)
AST SERPL-CCNC: 23 U/L (ref 15–37)
BASOPHILS ABSOLUTE: 0.1 K/UL (ref 0–0.2)
BASOPHILS RELATIVE PERCENT: 1.1 %
BILIRUB SERPL-MCNC: 0.5 MG/DL (ref 0–1)
BUN BLDV-MCNC: 15 MG/DL (ref 7–20)
C-REACTIVE PROTEIN, HIGH SENSITIVITY: 8.36 MG/L (ref 0.16–3)
CALCIUM SERPL-MCNC: 10.5 MG/DL (ref 8.3–10.6)
CHLORIDE BLD-SCNC: 97 MMOL/L (ref 99–110)
CHOLESTEROL, TOTAL: 189 MG/DL (ref 0–199)
CO2: 25 MMOL/L (ref 21–32)
CREAT SERPL-MCNC: 0.9 MG/DL (ref 0.9–1.3)
EOSINOPHILS ABSOLUTE: 0.2 K/UL (ref 0–0.6)
EOSINOPHILS RELATIVE PERCENT: 2.1 %
GFR AFRICAN AMERICAN: >60
GFR NON-AFRICAN AMERICAN: >60
GLOBULIN: 2.9 G/DL
GLUCOSE BLD-MCNC: 115 MG/DL (ref 70–99)
HCT VFR BLD CALC: 46.7 % (ref 40.5–52.5)
HDLC SERPL-MCNC: 45 MG/DL (ref 40–60)
HEMOGLOBIN: 15.6 G/DL (ref 13.5–17.5)
LDL CHOLESTEROL CALCULATED: 120 MG/DL
LYMPHOCYTES ABSOLUTE: 1.7 K/UL (ref 1–5.1)
LYMPHOCYTES RELATIVE PERCENT: 21.5 %
MCH RBC QN AUTO: 28.3 PG (ref 26–34)
MCHC RBC AUTO-ENTMCNC: 33.4 G/DL (ref 31–36)
MCV RBC AUTO: 84.7 FL (ref 80–100)
MONOCYTES ABSOLUTE: 0.5 K/UL (ref 0–1.3)
MONOCYTES RELATIVE PERCENT: 6.1 %
NEUTROPHILS ABSOLUTE: 5.6 K/UL (ref 1.7–7.7)
NEUTROPHILS RELATIVE PERCENT: 69.2 %
PDW BLD-RTO: 14.3 % (ref 12.4–15.4)
PLATELET # BLD: 357 K/UL (ref 135–450)
PMV BLD AUTO: 6.8 FL (ref 5–10.5)
POTASSIUM SERPL-SCNC: 5 MMOL/L (ref 3.5–5.1)
PROSTATE SPECIFIC ANTIGEN: 1.77 NG/ML (ref 0–4)
RBC # BLD: 5.52 M/UL (ref 4.2–5.9)
SODIUM BLD-SCNC: 139 MMOL/L (ref 136–145)
TOTAL PROTEIN: 8 G/DL (ref 6.4–8.2)
TRIGL SERPL-MCNC: 118 MG/DL (ref 0–150)
VLDLC SERPL CALC-MCNC: 24 MG/DL
WBC # BLD: 8.1 K/UL (ref 4–11)

## 2018-10-17 PROCEDURE — 90686 IIV4 VACC NO PRSV 0.5 ML IM: CPT | Performed by: FAMILY MEDICINE

## 2018-10-17 PROCEDURE — 90471 IMMUNIZATION ADMIN: CPT | Performed by: FAMILY MEDICINE

## 2018-10-17 PROCEDURE — 4004F PT TOBACCO SCREEN RCVD TLK: CPT | Performed by: FAMILY MEDICINE

## 2018-10-17 PROCEDURE — 3017F COLORECTAL CA SCREEN DOC REV: CPT | Performed by: FAMILY MEDICINE

## 2018-10-17 PROCEDURE — G8417 CALC BMI ABV UP PARAM F/U: HCPCS | Performed by: FAMILY MEDICINE

## 2018-10-17 PROCEDURE — G8427 DOCREV CUR MEDS BY ELIG CLIN: HCPCS | Performed by: FAMILY MEDICINE

## 2018-10-17 PROCEDURE — G8598 ASA/ANTIPLAT THER USED: HCPCS | Performed by: FAMILY MEDICINE

## 2018-10-17 PROCEDURE — G8482 FLU IMMUNIZE ORDER/ADMIN: HCPCS | Performed by: FAMILY MEDICINE

## 2018-10-17 PROCEDURE — 99214 OFFICE O/P EST MOD 30 MIN: CPT | Performed by: FAMILY MEDICINE

## 2018-10-17 RX ORDER — AMLODIPINE, VALSARTAN AND HYDROCHLOROTHIAZIDE 10; 160; 12.5 MG/1; MG/1; MG/1
TABLET ORAL
Qty: 90 TABLET | Refills: 1 | Status: SHIPPED | OUTPATIENT
Start: 2018-10-17 | End: 2019-03-22 | Stop reason: SDUPTHER

## 2018-10-17 RX ORDER — HYDROCODONE BITARTRATE AND ACETAMINOPHEN 5; 325 MG/1; MG/1
1 TABLET ORAL 2 TIMES DAILY
Qty: 60 TABLET | Refills: 0 | Status: SHIPPED | OUTPATIENT
Start: 2018-10-17 | End: 2018-12-12 | Stop reason: SDUPTHER

## 2018-10-17 ASSESSMENT — ENCOUNTER SYMPTOMS
NAUSEA: 0
BACK PAIN: 1
CHOKING: 0
BLOOD IN STOOL: 0
COLOR CHANGE: 0
TROUBLE SWALLOWING: 0
CHEST TIGHTNESS: 0
ABDOMINAL DISTENTION: 0
SINUS PAIN: 0
COUGH: 0
PHOTOPHOBIA: 0
SHORTNESS OF BREATH: 1
APNEA: 0
VOMITING: 0
CONSTIPATION: 0
DIARRHEA: 0
VOICE CHANGE: 0
SORE THROAT: 0
EYE DISCHARGE: 0
SINUS PRESSURE: 0
WHEEZING: 0
ABDOMINAL PAIN: 0
EYE REDNESS: 0
EYE PAIN: 0
EYE ITCHING: 0
RHINORRHEA: 0

## 2018-10-17 NOTE — PROGRESS NOTES
OBJECTIVE:  /80 (Site: Left Upper Arm, Position: Sitting, Cuff Size: Medium Adult)   Pulse 109   Wt 198 lb (89.8 kg)   SpO2 98%   BMI 31.01 kg/m²   Physical Exam   Constitutional: He is oriented to person, place, and time. He appears well-developed and well-nourished. HENT:   Head: Normocephalic. Right Ear: External ear normal.   Left Ear: External ear normal.   Nose: Nose normal.   Mouth/Throat: Oropharynx is clear and moist. No oropharyngeal exudate. Eyes: Pupils are equal, round, and reactive to light. Conjunctivae and EOM are normal.   Neck: Normal range of motion. Neck supple. Cardiovascular: Normal rate, regular rhythm and normal heart sounds. No murmur heard. Pulmonary/Chest: Effort normal and breath sounds normal.   Abdominal: Soft. Bowel sounds are normal.   Musculoskeletal: Normal range of motion. Neurological: He is alert and oriented to person, place, and time. He has normal reflexes. Skin: Skin is warm. Psychiatric: He has a normal mood and affect. His behavior is normal. Judgment and thought content normal.   Vitals reviewed. ASSESSMENT/PLAN:    ICD-10-CM    1. Ankylosing spondylitis of lumbosacral region ([facility] [address] M45.7    2. Need for influenza vaccination Z23 INFLUENZA, QUADV, 3 YRS AND OLDER, IM, PF, PREFILL SYR OR SDV, 0.5ML (FLUZONE QUADV, PF)   3. Obstructive sleep apnea syndrome G47.33    4. Atherosclerosis I70.90 25615 - MI Duplex Scan Extracranial, Mauricio     Stress Test W Pharm     CBC Auto Differential     Comprehensive Metabolic Panel     Lipid Panel     CRP,High Sensitivity     CBC Auto Differential     Comprehensive Metabolic Panel     Lipid Panel     CRP,High Sensitivity   5. Lung nodule, multiple R91.8 Patience Naidu MD   6.  Tobacco abuse Z72.0    7. Essential hypertension I10 CBC Auto Differential     Comprehensive Metabolic Panel     amLODIPine-valsartan-HCTZ (EXFORGE HCT) -12.5 MG TABS     CBC Auto Differential     Comprehensive

## 2018-10-19 RX ORDER — ATORVASTATIN CALCIUM 20 MG/1
20 TABLET, FILM COATED ORAL DAILY
Qty: 90 TABLET | Refills: 1 | Status: SHIPPED | OUTPATIENT
Start: 2018-10-19 | End: 2019-03-25 | Stop reason: SDUPTHER

## 2018-10-20 LAB
6-ACETYLMORPHINE: NOT DETECTED
7-AMINOCLONAZEPAM: NOT DETECTED
ALPHA-OH-ALPRAZOLAM: NOT DETECTED
ALPRAZOLAM: NOT DETECTED
AMPHETAMINE: NOT DETECTED
BARBITURATES: NOT DETECTED
BENZOYLECGONINE: NOT DETECTED
BUPRENORPHINE: NOT DETECTED
CARISOPRODOL: NOT DETECTED
CLONAZEPAM: NOT DETECTED
CODEINE: NOT DETECTED
CREATININE URINE: <20 MG/DL (ref 20–400)
DIAZEPAM: NOT DETECTED
DRUGS EXPECTED: ABNORMAL
EER PAIN MGT DRUG PANEL, HIGH RES/EMIT U: ABNORMAL
ETHYL GLUCURONIDE: NOT DETECTED
FENTANYL: NOT DETECTED
HYDROCODONE: PRESENT
HYDROMORPHONE: NOT DETECTED
LORAZEPAM: NOT DETECTED
MARIJUANA METABOLITE: NOT DETECTED
MDA: NOT DETECTED
MDEA: NOT DETECTED
MDMA URINE: NOT DETECTED
MEPERIDINE: NOT DETECTED
METHADONE: NOT DETECTED
METHAMPHETAMINE: NOT DETECTED
METHYLPHENIDATE: NOT DETECTED
MIDAZOLAM: NOT DETECTED
MORPHINE: NOT DETECTED
NORBUPRENORPHINE, FREE: NOT DETECTED
NORDIAZEPAM: NOT DETECTED
NORFENTANYL: NOT DETECTED
NORHYDROCODONE, URINE: NOT DETECTED
NOROXYCODONE: NOT DETECTED
NOROXYMORPHONE, URINE: NOT DETECTED
OXAZEPAM: NOT DETECTED
OXYCODONE: NOT DETECTED
OXYMORPHONE: NOT DETECTED
PAIN MANAGEMENT DRUG PANEL: ABNORMAL
PAIN MANAGEMENT DRUG PANEL: ABNORMAL
PCP: NOT DETECTED
PHENTERMINE: NOT DETECTED
PROPOXYPHENE: NOT DETECTED
TAPENTADOL, URINE: NOT DETECTED
TAPENTADOL-O-SULFATE, URINE: NOT DETECTED
TEMAZEPAM: NOT DETECTED
TRAMADOL: NOT DETECTED
ZOLPIDEM: NOT DETECTED

## 2018-11-02 DIAGNOSIS — Z86.73 HISTORY OF CVA (CEREBROVASCULAR ACCIDENT): ICD-10-CM

## 2018-11-02 DIAGNOSIS — I70.90 ARTERIOSCLEROTIC VASCULAR DISEASE: Primary | ICD-10-CM

## 2018-12-12 ENCOUNTER — PATIENT MESSAGE (OUTPATIENT)
Dept: FAMILY MEDICINE CLINIC | Age: 58
End: 2018-12-12

## 2018-12-12 DIAGNOSIS — M51.37 DDD (DEGENERATIVE DISC DISEASE), LUMBOSACRAL: ICD-10-CM

## 2018-12-12 DIAGNOSIS — G89.29 CHRONIC BILATERAL LOW BACK PAIN WITHOUT SCIATICA: ICD-10-CM

## 2018-12-12 DIAGNOSIS — M54.50 CHRONIC BILATERAL LOW BACK PAIN WITHOUT SCIATICA: ICD-10-CM

## 2018-12-12 DIAGNOSIS — M50.30 DDD (DEGENERATIVE DISC DISEASE), CERVICAL: ICD-10-CM

## 2018-12-12 RX ORDER — HYDROCODONE BITARTRATE AND ACETAMINOPHEN 5; 325 MG/1; MG/1
1 TABLET ORAL 2 TIMES DAILY
Qty: 60 TABLET | Refills: 0 | Status: SHIPPED | OUTPATIENT
Start: 2018-12-12 | End: 2019-01-18 | Stop reason: SDUPTHER

## 2019-01-02 ENCOUNTER — PATIENT MESSAGE (OUTPATIENT)
Dept: FAMILY MEDICINE CLINIC | Age: 59
End: 2019-01-02

## 2019-01-18 ENCOUNTER — OFFICE VISIT (OUTPATIENT)
Dept: FAMILY MEDICINE CLINIC | Age: 59
End: 2019-01-18
Payer: COMMERCIAL

## 2019-01-18 VITALS
WEIGHT: 199.6 LBS | DIASTOLIC BLOOD PRESSURE: 90 MMHG | BODY MASS INDEX: 31.26 KG/M2 | HEART RATE: 108 BPM | OXYGEN SATURATION: 98 % | SYSTOLIC BLOOD PRESSURE: 148 MMHG

## 2019-01-18 DIAGNOSIS — G47.33 OBSTRUCTIVE SLEEP APNEA SYNDROME: ICD-10-CM

## 2019-01-18 DIAGNOSIS — I10 ESSENTIAL HYPERTENSION: ICD-10-CM

## 2019-01-18 DIAGNOSIS — M51.37 DDD (DEGENERATIVE DISC DISEASE), LUMBOSACRAL: Primary | ICD-10-CM

## 2019-01-18 DIAGNOSIS — G89.29 CHRONIC BILATERAL LOW BACK PAIN WITHOUT SCIATICA: ICD-10-CM

## 2019-01-18 DIAGNOSIS — Z72.0 TOBACCO ABUSE: ICD-10-CM

## 2019-01-18 DIAGNOSIS — M45.7 ANKYLOSING SPONDYLITIS OF LUMBOSACRAL REGION (HCC): ICD-10-CM

## 2019-01-18 DIAGNOSIS — M54.50 CHRONIC BILATERAL LOW BACK PAIN WITHOUT SCIATICA: ICD-10-CM

## 2019-01-18 PROCEDURE — 3017F COLORECTAL CA SCREEN DOC REV: CPT | Performed by: FAMILY MEDICINE

## 2019-01-18 PROCEDURE — G8417 CALC BMI ABV UP PARAM F/U: HCPCS | Performed by: FAMILY MEDICINE

## 2019-01-18 PROCEDURE — G8482 FLU IMMUNIZE ORDER/ADMIN: HCPCS | Performed by: FAMILY MEDICINE

## 2019-01-18 PROCEDURE — G8427 DOCREV CUR MEDS BY ELIG CLIN: HCPCS | Performed by: FAMILY MEDICINE

## 2019-01-18 PROCEDURE — G8598 ASA/ANTIPLAT THER USED: HCPCS | Performed by: FAMILY MEDICINE

## 2019-01-18 PROCEDURE — 4004F PT TOBACCO SCREEN RCVD TLK: CPT | Performed by: FAMILY MEDICINE

## 2019-01-18 PROCEDURE — 99214 OFFICE O/P EST MOD 30 MIN: CPT | Performed by: FAMILY MEDICINE

## 2019-01-18 RX ORDER — BLOOD PRESSURE TEST KIT
KIT MISCELLANEOUS
Qty: 1 KIT | Refills: 0 | Status: SHIPPED | OUTPATIENT
Start: 2019-01-18 | End: 2021-06-14

## 2019-01-18 RX ORDER — HYDROCODONE BITARTRATE AND ACETAMINOPHEN 5; 325 MG/1; MG/1
1 TABLET ORAL 2 TIMES DAILY
Qty: 60 TABLET | Refills: 0 | Status: SHIPPED | OUTPATIENT
Start: 2019-01-18 | End: 2019-02-20 | Stop reason: SDUPTHER

## 2019-01-18 ASSESSMENT — ENCOUNTER SYMPTOMS
SINUS PRESSURE: 0
EYE REDNESS: 0
DIARRHEA: 0
BACK PAIN: 1
COLOR CHANGE: 0
CHOKING: 0
TROUBLE SWALLOWING: 0
ABDOMINAL PAIN: 0
RHINORRHEA: 0
BLOOD IN STOOL: 0
VOICE CHANGE: 0
WHEEZING: 0
EYE PAIN: 0
CHEST TIGHTNESS: 0
NAUSEA: 0
EYE ITCHING: 0
PHOTOPHOBIA: 0
ABDOMINAL DISTENTION: 0
SHORTNESS OF BREATH: 0
EYE DISCHARGE: 0
COUGH: 0
VOMITING: 0
SINUS PAIN: 0
CONSTIPATION: 0
APNEA: 0
SORE THROAT: 0

## 2019-01-18 ASSESSMENT — PATIENT HEALTH QUESTIONNAIRE - PHQ9
SUM OF ALL RESPONSES TO PHQ QUESTIONS 1-9: 0
1. LITTLE INTEREST OR PLEASURE IN DOING THINGS: 0
2. FEELING DOWN, DEPRESSED OR HOPELESS: 0
SUM OF ALL RESPONSES TO PHQ9 QUESTIONS 1 & 2: 0
SUM OF ALL RESPONSES TO PHQ QUESTIONS 1-9: 0

## 2019-02-20 ENCOUNTER — PATIENT MESSAGE (OUTPATIENT)
Dept: FAMILY MEDICINE CLINIC | Age: 59
End: 2019-02-20

## 2019-02-20 DIAGNOSIS — M51.37 DDD (DEGENERATIVE DISC DISEASE), LUMBOSACRAL: ICD-10-CM

## 2019-02-20 DIAGNOSIS — G89.29 CHRONIC BILATERAL LOW BACK PAIN WITHOUT SCIATICA: ICD-10-CM

## 2019-02-20 DIAGNOSIS — M54.50 CHRONIC BILATERAL LOW BACK PAIN WITHOUT SCIATICA: ICD-10-CM

## 2019-02-20 RX ORDER — HYDROCODONE BITARTRATE AND ACETAMINOPHEN 5; 325 MG/1; MG/1
1 TABLET ORAL 2 TIMES DAILY
Qty: 60 TABLET | Refills: 0 | Status: SHIPPED | OUTPATIENT
Start: 2019-02-20 | End: 2019-03-22 | Stop reason: SDUPTHER

## 2019-03-22 DIAGNOSIS — G89.29 CHRONIC BILATERAL LOW BACK PAIN WITHOUT SCIATICA: ICD-10-CM

## 2019-03-22 DIAGNOSIS — I10 ESSENTIAL HYPERTENSION: ICD-10-CM

## 2019-03-22 DIAGNOSIS — M54.50 CHRONIC BILATERAL LOW BACK PAIN WITHOUT SCIATICA: ICD-10-CM

## 2019-03-22 DIAGNOSIS — M51.37 DDD (DEGENERATIVE DISC DISEASE), LUMBOSACRAL: ICD-10-CM

## 2019-03-22 RX ORDER — HYDROCODONE BITARTRATE AND ACETAMINOPHEN 5; 325 MG/1; MG/1
1 TABLET ORAL 2 TIMES DAILY
Qty: 60 TABLET | Refills: 0 | Status: SHIPPED | OUTPATIENT
Start: 2019-03-22 | End: 2019-04-26 | Stop reason: SDUPTHER

## 2019-03-22 RX ORDER — AMLODIPINE, VALSARTAN AND HYDROCHLOROTHIAZIDE 10; 160; 12.5 MG/1; MG/1; MG/1
TABLET ORAL
Qty: 90 TABLET | Refills: 1 | Status: SHIPPED | OUTPATIENT
Start: 2019-03-22 | End: 2019-10-21 | Stop reason: SDUPTHER

## 2019-03-25 DIAGNOSIS — E78.00 HYPERCHOLESTEROLEMIA: Primary | ICD-10-CM

## 2019-03-25 RX ORDER — ATORVASTATIN CALCIUM 20 MG/1
20 TABLET, FILM COATED ORAL DAILY
Qty: 90 TABLET | Refills: 1 | Status: SHIPPED | OUTPATIENT
Start: 2019-03-25 | End: 2019-04-01 | Stop reason: SDUPTHER

## 2019-04-01 DIAGNOSIS — E78.00 HYPERCHOLESTEROLEMIA: ICD-10-CM

## 2019-04-01 RX ORDER — ATORVASTATIN CALCIUM 20 MG/1
20 TABLET, FILM COATED ORAL DAILY
Qty: 90 TABLET | Refills: 1 | Status: SHIPPED | OUTPATIENT
Start: 2019-04-01 | End: 2019-04-01 | Stop reason: SDUPTHER

## 2019-04-01 RX ORDER — ATORVASTATIN CALCIUM 20 MG/1
20 TABLET, FILM COATED ORAL DAILY
Qty: 90 TABLET | Refills: 1 | Status: SHIPPED | OUTPATIENT
Start: 2019-04-01 | End: 2019-06-07 | Stop reason: SDUPTHER

## 2019-04-01 NOTE — TELEPHONE ENCOUNTER
Requested Prescriptions     Pending Prescriptions Disp Refills    atorvastatin (LIPITOR) 20 MG tablet 90 tablet 1     Sig: Take 1 tablet by mouth daily     LV: 01/18/2019  NV: 04/19/2019

## 2019-04-26 ENCOUNTER — OFFICE VISIT (OUTPATIENT)
Dept: FAMILY MEDICINE CLINIC | Age: 59
End: 2019-04-26
Payer: COMMERCIAL

## 2019-04-26 VITALS
SYSTOLIC BLOOD PRESSURE: 146 MMHG | BODY MASS INDEX: 32.04 KG/M2 | OXYGEN SATURATION: 99 % | DIASTOLIC BLOOD PRESSURE: 80 MMHG | WEIGHT: 204.6 LBS | HEART RATE: 100 BPM

## 2019-04-26 DIAGNOSIS — R91.8 LUNG NODULE, MULTIPLE: ICD-10-CM

## 2019-04-26 DIAGNOSIS — I70.90 ATHEROSCLEROSIS: ICD-10-CM

## 2019-04-26 DIAGNOSIS — E78.00 HYPERCHOLESTEROLEMIA: ICD-10-CM

## 2019-04-26 DIAGNOSIS — M51.37 DDD (DEGENERATIVE DISC DISEASE), LUMBOSACRAL: Primary | ICD-10-CM

## 2019-04-26 DIAGNOSIS — M54.50 CHRONIC BILATERAL LOW BACK PAIN WITHOUT SCIATICA: ICD-10-CM

## 2019-04-26 DIAGNOSIS — G47.33 OBSTRUCTIVE SLEEP APNEA SYNDROME: ICD-10-CM

## 2019-04-26 DIAGNOSIS — I10 ESSENTIAL HYPERTENSION: ICD-10-CM

## 2019-04-26 DIAGNOSIS — Z72.0 TOBACCO ABUSE: ICD-10-CM

## 2019-04-26 DIAGNOSIS — G89.29 CHRONIC BILATERAL LOW BACK PAIN WITHOUT SCIATICA: ICD-10-CM

## 2019-04-26 PROCEDURE — 4004F PT TOBACCO SCREEN RCVD TLK: CPT | Performed by: FAMILY MEDICINE

## 2019-04-26 PROCEDURE — G8427 DOCREV CUR MEDS BY ELIG CLIN: HCPCS | Performed by: FAMILY MEDICINE

## 2019-04-26 PROCEDURE — 99214 OFFICE O/P EST MOD 30 MIN: CPT | Performed by: FAMILY MEDICINE

## 2019-04-26 PROCEDURE — G8417 CALC BMI ABV UP PARAM F/U: HCPCS | Performed by: FAMILY MEDICINE

## 2019-04-26 PROCEDURE — G8598 ASA/ANTIPLAT THER USED: HCPCS | Performed by: FAMILY MEDICINE

## 2019-04-26 PROCEDURE — 3017F COLORECTAL CA SCREEN DOC REV: CPT | Performed by: FAMILY MEDICINE

## 2019-04-26 RX ORDER — HYDROCODONE BITARTRATE AND ACETAMINOPHEN 5; 325 MG/1; MG/1
1 TABLET ORAL 2 TIMES DAILY
Qty: 60 TABLET | Refills: 0 | Status: SHIPPED | OUTPATIENT
Start: 2019-04-26 | End: 2019-06-07 | Stop reason: SDUPTHER

## 2019-04-26 ASSESSMENT — ENCOUNTER SYMPTOMS
BACK PAIN: 1
TROUBLE SWALLOWING: 0
ABDOMINAL PAIN: 0
VOICE CHANGE: 0
SORE THROAT: 0

## 2019-04-26 NOTE — PROGRESS NOTES
SUBJECTIVE:  Patient ID: Bonilla Teresa is a 61 y.o. male. Chief Complaint:  Chief Complaint   Patient presents with    Back Pain     med check       HPI   61year old male  In for FU 3 month Control Rx  Chronic back & neck pain  Hx MRI C spine & lumbar spine   Dx DDD C spine   DxDDD Lumbar Spine   Dx Ankylosing spondylitis  Pain scales changes daily most of the time 5-6 it can get 8-9 mostly Lower back  Back is stiff all the time +difficult to turn  Daily activity He tries to walk about 15 minutes twice /day   He help in [place]  Wife has FT job   He socialize with neighbor 3 x/week   Denies depression or suicidal thoughts or ideation   Hypertension compliant with Rx no side effect  Lipid disorder compliant with Rx no side effect  Vit D deficiency compliant with Rx no side effect  ASA 81 mg daily  Past Medical History:   Diagnosis Date    CVA (cerebral infarction)     hosp x 2 days [address]    Hypertension     Sleep apnea    No past surgical history on file. No Known Allergies  Family History   Problem Relation Age of Onset    High Blood Pressure Mother          age 68    High Blood Pressure Father          age 47    High Cholesterol Father    Yin Andrea Cancer Sister         Brain age 79    [de-identified] Brother          age 79    Heart Attack Brother         age 52    Cancer Brother         age 8o >neck recovered     Social History     Tobacco Use    Smoking status: Current Every Day Smoker     Packs/day: 1.00     Years: 35.00     Pack years: 35.00     Types: Cigarettes    Smokeless tobacco: Never Used    Tobacco comment:  patch No success down 1/2 ppd declined other Rx   Substance Use Topics    Alcohol use:  Yes     Alcohol/week: 0.0 oz     Comment: beer daily    Drug use: No           Patient Active Problem List   Diagnosis    History of CVA (cerebrovascular accident)    Sleep apnea    Ankylosing [facility]ylitis ([facility]    Hypertension    Tobacco abuse    DDD (degenerative disc disease), cervical    DDD (degenerative disc disease), lumbosacral    Sacroiliac joint dysfunction    Chronic back pain    Family history of coronary artery disease    Vitamin D deficiency    Diverticulosis of intestine without bleeding    Coronary artery calcification    Emphysema/COPD (HCC)    Adenomatous polyp     Current Outpatient Medications   Medication Sig Dispense Refill    atorvastatin (LIPITOR) 20 MG tablet Take 1 tablet by mouth daily 90 tablet 1    amLODIPine-valsartan-HCTZ (EXFORGE HCT) -12.5 MG TABS One tablet qd 90 tablet 1    Blood Pressure KIT Hypertension I10 1 kit 0    vitamin D (ERGOCALCIFEROL) 84385 UNITS CAPS capsule Take 1 capsule by mouth once a week 12 capsule 1    aspirin 325 MG tablet Take 325 mg by mouth daily. No current facility-administered medications for this visit.       Lab Results   Component Value Date    WBC 8.1 10/17/2018    HGB 15.6 10/17/2018    HCT 46.7 10/17/2018    MCV 84.7 10/17/2018     10/17/2018     Lab Results   Component Value Date    CHOL 189 10/17/2018    CHOL 164 (H) 10/23/2017    CHOL 174 10/24/2016    CHOL 130 10/24/2016     Lab Results   Component Value Date    TRIG 118 10/17/2018    TRIG 79 10/24/2016     Lab Results   Component Value Date    HDL 45 10/17/2018    HDL 44 10/24/2016     Lab Results   Component Value Date    LDLCALC 120 (H) 10/17/2018    LDLCALC 114 10/24/2016     Lab Results   Component Value Date    LABVLDL 24 10/17/2018     Lab Results   Component Value Date    CHOLHDLRATIO 4.0 10/24/2016       Chemistry        Component Value Date/Time     10/17/2018 0959    K 5.0 10/17/2018 0959    CL 97 (L) 10/17/2018 0959    CO2 25 10/17/2018 0959    BUN 15 10/17/2018 0959    CREATININE 0.9 10/17/2018 0959        Component Value Date/Time    CALCIUM 10.5 10/17/2018 0959    ALKPHOS 119 10/17/2018 0959    AST 23 10/17/2018 0959    ALT 22 10/17/2018 0959    BILITOT 0.5 10/17/2018 0959            Review of Systems   Constitutional: Negative for chills, diaphoresis and fever. HENT: Negative for congestion, sore throat, trouble swallowing and voice change. Ringing Lt ear x 8-9 years   Eyes:        Due eye check   Respiratory:        CPAP  He gets supply on Line  +Tobacco use 1ppd   Cardiovascular: Negative for chest pain, palpitations and leg swelling. Gastrointestinal: Negative for abdominal pain. BM good   Genitourinary: Negative for dysuria, flank pain and frequency. Slow stream once a while   Musculoskeletal: Positive for back pain and neck pain. Negative for gait problem. Pain shoot RT leg once a while    Allergic/Immunologic: Negative for environmental allergies. Neurological: Negative for dizziness, light-headedness and headaches. Numbness when stand too long RT thigh   Psychiatric/Behavioral: Negative for behavioral problems, decreased concentration, hallucinations, self-injury, sleep disturbance and suicidal ideas. The patient is not nervous/anxious and is not hyperactive. Sleep OK with CPAP       OBJECTIVE:  BP (!) 146/80 (Site: Left Upper Arm, Position: Sitting, Cuff Size: Medium Adult)   Pulse 100   Wt 204 lb 9.6 oz (92.8 kg)   SpO2 99%   BMI 32.04 kg/m²   Physical Exam   Constitutional: He is oriented to person, place, and time. No distress. HENT:   Head: Normocephalic. Right Ear: External ear normal.   Left Ear: External ear normal.   Nose: Nose normal.   Mouth/Throat: Oropharynx is clear and moist.   Eyes: Pupils are equal, round, and reactive to light. EOM are normal.   Neck: Normal range of motion. Neck supple. Cardiovascular: Normal rate, regular rhythm and normal heart sounds. No murmur heard. Pulmonary/Chest: Effort normal. He has no wheezes. He has no rales. He exhibits no tenderness. Harsh Breath sound   Musculoskeletal: He exhibits no edema.    Straight leg 90 degree Lt  About 80 degree Rt  He is able to walk straight line  He is able walk tip toe & heel just few steps   Lymphadenopathy:     He has no cervical adenopathy. Neurological: He is alert and oriented to person, place, and time. DTR 1/4   Skin: Skin is dry. He is not diaphoretic. Psychiatric: He has a normal mood and affect. His behavior is normal. Thought content normal.   Vitals reviewed. ASSESSMENT/PLAN:      Diagnosis Orders   1. DDD (degenerative disc disease), lumbosacral  HYDROcodone-acetaminophen (NORCO) 5-325 MG per tablet    Drug Panel-PM-HI Res-UR Interp-A    Drug Panel-PM-HI Res-UR Interp-A   2. Obstructive sleep apnea syndrome  Kaylah Tapia MD, Sleep Medicine, [facility]    CT Chest WO Contrast   3. Lung nodule, multiple  CT Chest WO Contrast   4. Atherosclerosis  CT Chest WO Contrast   5. Hypercholesterolemia  CT Chest WO Contrast   6. Essential hypertension  CT Chest WO Contrast   7. Tobacco abuse  CT Chest WO Contrast   8.  Chronic bilateral low back pain without sciatica  HYDROcodone-acetaminophen (NORCO) 5-325 MG per tablet    Drug Panel-PM-HI Res-UR Interp-A    Drug Panel-PM-HI Res-UR Interp-A         Tobacco use he is aware of risk & fatality of tobacco use   Pt states he has no desire quit for now  Get up date CT Chest  OARRS check & review done   Urine screen  Refer to Dr Mee Mendiola

## 2019-04-30 LAB
6-ACETYLMORPHINE: NOT DETECTED
7-AMINOCLONAZEPAM: NOT DETECTED
ALPHA-OH-ALPRAZOLAM: NOT DETECTED
ALPRAZOLAM: NOT DETECTED
AMPHETAMINE: NOT DETECTED
BARBITURATES: NOT DETECTED
BENZOYLECGONINE: NOT DETECTED
BUPRENORPHINE: NOT DETECTED
CARISOPRODOL: NOT DETECTED
CLONAZEPAM: NOT DETECTED
CODEINE: NOT DETECTED
CREATININE URINE: <20 MG/DL (ref 20–400)
DIAZEPAM: NOT DETECTED
DRUGS EXPECTED: ABNORMAL
EER PAIN MGT DRUG PANEL, HIGH RES/EMIT U: ABNORMAL
ETHYL GLUCURONIDE: NOT DETECTED
FENTANYL: NOT DETECTED
HYDROCODONE: NOT DETECTED
HYDROMORPHONE: NOT DETECTED
LORAZEPAM: NOT DETECTED
MARIJUANA METABOLITE: NOT DETECTED
MDA: NOT DETECTED
MDEA: NOT DETECTED
MDMA URINE: NOT DETECTED
MEPERIDINE: NOT DETECTED
METHADONE: NOT DETECTED
METHAMPHETAMINE: NOT DETECTED
METHYLPHENIDATE: NOT DETECTED
MIDAZOLAM: NOT DETECTED
MORPHINE: NOT DETECTED
NORBUPRENORPHINE, FREE: NOT DETECTED
NORDIAZEPAM: NOT DETECTED
NORFENTANYL: NOT DETECTED
NORHYDROCODONE, URINE: NOT DETECTED
NOROXYCODONE: NOT DETECTED
NOROXYMORPHONE, URINE: NOT DETECTED
OXAZEPAM: NOT DETECTED
OXYCODONE: NOT DETECTED
OXYMORPHONE: NOT DETECTED
PAIN MANAGEMENT DRUG PANEL: ABNORMAL
PAIN MANAGEMENT DRUG PANEL: ABNORMAL
PCP: NOT DETECTED
PHENTERMINE: NOT DETECTED
PROPOXYPHENE: NOT DETECTED
TAPENTADOL, URINE: NOT DETECTED
TAPENTADOL-O-SULFATE, URINE: NOT DETECTED
TEMAZEPAM: NOT DETECTED
TRAMADOL: NOT DETECTED
ZOLPIDEM: NOT DETECTED

## 2019-06-06 ENCOUNTER — PATIENT MESSAGE (OUTPATIENT)
Dept: FAMILY MEDICINE CLINIC | Age: 59
End: 2019-06-06

## 2019-06-06 DIAGNOSIS — E78.00 HYPERCHOLESTEROLEMIA: ICD-10-CM

## 2019-06-06 NOTE — TELEPHONE ENCOUNTER
From: Marcos Grubbs  To: Trudell Koyanagi, MD  Sent: 6/6/2019 11:28 AM EDT  Subject: Prescription Question    Could you please refill my Atorvastatin 20mg. at the [facility] at [address]. [place]. [address] location please? Thank you.

## 2019-06-07 DIAGNOSIS — M54.50 CHRONIC BILATERAL LOW BACK PAIN WITHOUT SCIATICA: ICD-10-CM

## 2019-06-07 DIAGNOSIS — G89.29 CHRONIC BILATERAL LOW BACK PAIN WITHOUT SCIATICA: ICD-10-CM

## 2019-06-07 DIAGNOSIS — M51.37 DDD (DEGENERATIVE DISC DISEASE), LUMBOSACRAL: ICD-10-CM

## 2019-06-07 RX ORDER — ATORVASTATIN CALCIUM 20 MG/1
20 TABLET, FILM COATED ORAL DAILY
Qty: 90 TABLET | Refills: 1 | Status: SHIPPED | OUTPATIENT
Start: 2019-06-07 | End: 2019-12-26 | Stop reason: SDUPTHER

## 2019-06-07 RX ORDER — HYDROCODONE BITARTRATE AND ACETAMINOPHEN 5; 325 MG/1; MG/1
1 TABLET ORAL 2 TIMES DAILY
Qty: 60 TABLET | Refills: 0 | Status: SHIPPED | OUTPATIENT
Start: 2019-06-07 | End: 2019-07-07 | Stop reason: SDUPTHER

## 2019-07-07 DIAGNOSIS — M51.37 DDD (DEGENERATIVE DISC DISEASE), LUMBOSACRAL: ICD-10-CM

## 2019-07-07 DIAGNOSIS — G89.29 CHRONIC BILATERAL LOW BACK PAIN WITHOUT SCIATICA: ICD-10-CM

## 2019-07-07 DIAGNOSIS — M54.50 CHRONIC BILATERAL LOW BACK PAIN WITHOUT SCIATICA: ICD-10-CM

## 2019-07-08 RX ORDER — HYDROCODONE BITARTRATE AND ACETAMINOPHEN 5; 325 MG/1; MG/1
1 TABLET ORAL 2 TIMES DAILY
Qty: 60 TABLET | Refills: 0 | Status: SHIPPED | OUTPATIENT
Start: 2019-07-08 | End: 2019-08-09 | Stop reason: SDUPTHER

## 2019-07-17 ENCOUNTER — TELEPHONE (OUTPATIENT)
Dept: PULMONOLOGY | Age: 59
End: 2019-07-17

## 2019-07-25 ENCOUNTER — HOSPITAL ENCOUNTER (OUTPATIENT)
Dept: CT IMAGING | Age: 59
Discharge: HOME | End: 2019-07-25
Payer: COMMERCIAL

## 2019-07-25 DIAGNOSIS — E78.00 HYPERCHOLESTEROLEMIA: ICD-10-CM

## 2019-07-25 DIAGNOSIS — G47.33 OBSTRUCTIVE SLEEP APNEA SYNDROME: ICD-10-CM

## 2019-07-25 DIAGNOSIS — Z72.0 TOBACCO ABUSE: ICD-10-CM

## 2019-07-25 DIAGNOSIS — I10 ESSENTIAL HYPERTENSION, BENIGN: ICD-10-CM

## 2019-07-25 DIAGNOSIS — R91.8 PULMONARY NODULES: ICD-10-CM

## 2019-07-25 DIAGNOSIS — I70.90 ATHEROSCLEROSIS: ICD-10-CM

## 2019-07-25 PROCEDURE — 71250 CT THORAX DX C-: CPT

## 2019-08-09 ENCOUNTER — OFFICE VISIT (OUTPATIENT)
Dept: PRIMARY CARE CLINIC | Age: 59
End: 2019-08-09
Payer: COMMERCIAL

## 2019-08-09 VITALS
BODY MASS INDEX: 31.86 KG/M2 | HEART RATE: 107 BPM | SYSTOLIC BLOOD PRESSURE: 132 MMHG | DIASTOLIC BLOOD PRESSURE: 70 MMHG | OXYGEN SATURATION: 99 % | WEIGHT: 203.4 LBS

## 2019-08-09 DIAGNOSIS — M54.50 CHRONIC BILATERAL LOW BACK PAIN WITHOUT SCIATICA: Primary | ICD-10-CM

## 2019-08-09 DIAGNOSIS — M51.37 DDD (DEGENERATIVE DISC DISEASE), LUMBOSACRAL: ICD-10-CM

## 2019-08-09 DIAGNOSIS — E78.00 HYPERCHOLESTEROLEMIA: ICD-10-CM

## 2019-08-09 DIAGNOSIS — M45.7 ANKYLOSING SPONDYLITIS OF LUMBOSACRAL REGION (HCC): ICD-10-CM

## 2019-08-09 DIAGNOSIS — I10 ESSENTIAL HYPERTENSION: ICD-10-CM

## 2019-08-09 DIAGNOSIS — E78.2 MIXED HYPERLIPIDEMIA: ICD-10-CM

## 2019-08-09 DIAGNOSIS — G89.29 CHRONIC BILATERAL LOW BACK PAIN WITHOUT SCIATICA: Primary | ICD-10-CM

## 2019-08-09 DIAGNOSIS — J43.9 PULMONARY EMPHYSEMA, UNSPECIFIED EMPHYSEMA TYPE (HCC): ICD-10-CM

## 2019-08-09 DIAGNOSIS — E78.9 LIPID DISORDER: ICD-10-CM

## 2019-08-09 PROCEDURE — G8427 DOCREV CUR MEDS BY ELIG CLIN: HCPCS | Performed by: FAMILY MEDICINE

## 2019-08-09 PROCEDURE — 3017F COLORECTAL CA SCREEN DOC REV: CPT | Performed by: FAMILY MEDICINE

## 2019-08-09 PROCEDURE — G8926 SPIRO NO PERF OR DOC: HCPCS | Performed by: FAMILY MEDICINE

## 2019-08-09 PROCEDURE — G8417 CALC BMI ABV UP PARAM F/U: HCPCS | Performed by: FAMILY MEDICINE

## 2019-08-09 PROCEDURE — G8598 ASA/ANTIPLAT THER USED: HCPCS | Performed by: FAMILY MEDICINE

## 2019-08-09 PROCEDURE — 4004F PT TOBACCO SCREEN RCVD TLK: CPT | Performed by: FAMILY MEDICINE

## 2019-08-09 PROCEDURE — 99214 OFFICE O/P EST MOD 30 MIN: CPT | Performed by: FAMILY MEDICINE

## 2019-08-09 PROCEDURE — 3023F SPIROM DOC REV: CPT | Performed by: FAMILY MEDICINE

## 2019-08-09 RX ORDER — HYDROCODONE BITARTRATE AND ACETAMINOPHEN 5; 325 MG/1; MG/1
1 TABLET ORAL 2 TIMES DAILY
Qty: 60 TABLET | Refills: 0 | Status: SHIPPED | OUTPATIENT
Start: 2019-08-09 | End: 2019-09-11 | Stop reason: SDUPTHER

## 2019-08-09 ASSESSMENT — ENCOUNTER SYMPTOMS
ALLERGIC/IMMUNOLOGIC NEGATIVE: 1
EYES NEGATIVE: 1
BACK PAIN: 1
RESPIRATORY NEGATIVE: 1
ABDOMINAL PAIN: 0

## 2019-09-10 ENCOUNTER — PATIENT MESSAGE (OUTPATIENT)
Dept: PRIMARY CARE CLINIC | Age: 59
End: 2019-09-10

## 2019-09-10 DIAGNOSIS — M54.50 CHRONIC BILATERAL LOW BACK PAIN WITHOUT SCIATICA: ICD-10-CM

## 2019-09-10 DIAGNOSIS — M45.7 ANKYLOSING SPONDYLITIS OF LUMBOSACRAL REGION (HCC): ICD-10-CM

## 2019-09-10 DIAGNOSIS — M51.37 DDD (DEGENERATIVE DISC DISEASE), LUMBOSACRAL: ICD-10-CM

## 2019-09-10 DIAGNOSIS — G89.29 CHRONIC BILATERAL LOW BACK PAIN WITHOUT SCIATICA: ICD-10-CM

## 2019-09-11 RX ORDER — HYDROCODONE BITARTRATE AND ACETAMINOPHEN 5; 325 MG/1; MG/1
1 TABLET ORAL 2 TIMES DAILY
Qty: 60 TABLET | Refills: 0 | Status: SHIPPED | OUTPATIENT
Start: 2019-09-11 | End: 2019-10-21 | Stop reason: SDUPTHER

## 2019-10-21 DIAGNOSIS — M54.50 CHRONIC BILATERAL LOW BACK PAIN WITHOUT SCIATICA: ICD-10-CM

## 2019-10-21 DIAGNOSIS — M45.7 ANKYLOSING SPONDYLITIS OF LUMBOSACRAL REGION (HCC): ICD-10-CM

## 2019-10-21 DIAGNOSIS — M51.37 DDD (DEGENERATIVE DISC DISEASE), LUMBOSACRAL: ICD-10-CM

## 2019-10-21 DIAGNOSIS — I10 ESSENTIAL HYPERTENSION: ICD-10-CM

## 2019-10-21 DIAGNOSIS — G89.29 CHRONIC BILATERAL LOW BACK PAIN WITHOUT SCIATICA: ICD-10-CM

## 2019-10-21 RX ORDER — HYDROCODONE BITARTRATE AND ACETAMINOPHEN 5; 325 MG/1; MG/1
1 TABLET ORAL 2 TIMES DAILY
Qty: 60 TABLET | Refills: 0 | Status: SHIPPED | OUTPATIENT
Start: 2019-10-21 | End: 2019-11-25 | Stop reason: SDUPTHER

## 2019-10-21 RX ORDER — AMLODIPINE, VALSARTAN AND HYDROCHLOROTHIAZIDE 10; 160; 12.5 MG/1; MG/1; MG/1
TABLET ORAL
Qty: 90 TABLET | Refills: 1 | Status: SHIPPED | OUTPATIENT
Start: 2019-10-21 | End: 2020-04-22 | Stop reason: SDUPTHER

## 2019-11-20 ENCOUNTER — OFFICE VISIT (OUTPATIENT)
Dept: PULMONOLOGY | Age: 59
End: 2019-11-20
Payer: COMMERCIAL

## 2019-11-20 ENCOUNTER — TELEPHONE (OUTPATIENT)
Dept: PULMONOLOGY | Age: 59
End: 2019-11-20

## 2019-11-20 VITALS
HEIGHT: 67 IN | WEIGHT: 205 LBS | DIASTOLIC BLOOD PRESSURE: 77 MMHG | OXYGEN SATURATION: 98 % | HEART RATE: 99 BPM | SYSTOLIC BLOOD PRESSURE: 131 MMHG | BODY MASS INDEX: 32.18 KG/M2

## 2019-11-20 DIAGNOSIS — E66.9 NON MORBID OBESITY, UNSPECIFIED OBESITY TYPE: Chronic | ICD-10-CM

## 2019-11-20 DIAGNOSIS — I25.84 CORONARY ARTERY CALCIFICATION: Chronic | ICD-10-CM

## 2019-11-20 DIAGNOSIS — G47.33 OBSTRUCTIVE SLEEP APNEA SYNDROME: Primary | ICD-10-CM

## 2019-11-20 DIAGNOSIS — I25.10 CORONARY ARTERY CALCIFICATION: Chronic | ICD-10-CM

## 2019-11-20 DIAGNOSIS — F11.90 CHRONIC NARCOTIC USE: Chronic | ICD-10-CM

## 2019-11-20 DIAGNOSIS — J43.9 PULMONARY EMPHYSEMA, UNSPECIFIED EMPHYSEMA TYPE (HCC): Chronic | ICD-10-CM

## 2019-11-20 DIAGNOSIS — I10 ESSENTIAL HYPERTENSION: Chronic | ICD-10-CM

## 2019-11-20 PROCEDURE — G8484 FLU IMMUNIZE NO ADMIN: HCPCS | Performed by: INTERNAL MEDICINE

## 2019-11-20 PROCEDURE — G8417 CALC BMI ABV UP PARAM F/U: HCPCS | Performed by: INTERNAL MEDICINE

## 2019-11-20 PROCEDURE — 99244 OFF/OP CNSLTJ NEW/EST MOD 40: CPT | Performed by: INTERNAL MEDICINE

## 2019-11-20 PROCEDURE — 3023F SPIROM DOC REV: CPT | Performed by: INTERNAL MEDICINE

## 2019-11-20 PROCEDURE — G8926 SPIRO NO PERF OR DOC: HCPCS | Performed by: INTERNAL MEDICINE

## 2019-11-20 PROCEDURE — G8427 DOCREV CUR MEDS BY ELIG CLIN: HCPCS | Performed by: INTERNAL MEDICINE

## 2019-11-20 ASSESSMENT — SLEEP AND FATIGUE QUESTIONNAIRES
HOW LIKELY ARE YOU TO NOD OFF OR FALL ASLEEP WHILE SITTING AND TALKING TO SOMEONE: 1
HOW LIKELY ARE YOU TO NOD OFF OR FALL ASLEEP WHILE SITTING AND READING: 2
HOW LIKELY ARE YOU TO NOD OFF OR FALL ASLEEP WHILE WATCHING TV: 1
ESS TOTAL SCORE: 10
HOW LIKELY ARE YOU TO NOD OFF OR FALL ASLEEP IN A CAR, WHILE STOPPED FOR A FEW MINUTES IN TRAFFIC: 0
HOW LIKELY ARE YOU TO NOD OFF OR FALL ASLEEP WHEN YOU ARE A PASSENGER IN A CAR FOR AN HOUR WITHOUT A BREAK: 1
NECK CIRCUMFERENCE (INCHES): 18.5
HOW LIKELY ARE YOU TO NOD OFF OR FALL ASLEEP WHILE SITTING INACTIVE IN A PUBLIC PLACE: 0
HOW LIKELY ARE YOU TO NOD OFF OR FALL ASLEEP WHILE LYING DOWN TO REST IN THE AFTERNOON WHEN CIRCUMSTANCES PERMIT: 3
HOW LIKELY ARE YOU TO NOD OFF OR FALL ASLEEP WHILE SITTING QUIETLY AFTER LUNCH WITHOUT ALCOHOL: 2

## 2019-11-20 ASSESSMENT — ENCOUNTER SYMPTOMS
APNEA: 0
VOMITING: 0
SHORTNESS OF BREATH: 0
EYE PAIN: 0
PHOTOPHOBIA: 0
CHOKING: 0
ABDOMINAL DISTENTION: 0
NAUSEA: 0
CHEST TIGHTNESS: 0
RHINORRHEA: 0
BACK PAIN: 1
ABDOMINAL PAIN: 0
ALLERGIC/IMMUNOLOGIC NEGATIVE: 1

## 2019-11-23 ENCOUNTER — PATIENT MESSAGE (OUTPATIENT)
Dept: PRIMARY CARE CLINIC | Age: 59
End: 2019-11-23

## 2019-11-23 DIAGNOSIS — M51.37 DDD (DEGENERATIVE DISC DISEASE), LUMBOSACRAL: ICD-10-CM

## 2019-11-23 DIAGNOSIS — G89.29 CHRONIC BILATERAL LOW BACK PAIN WITHOUT SCIATICA: ICD-10-CM

## 2019-11-23 DIAGNOSIS — M45.7 ANKYLOSING SPONDYLITIS OF LUMBOSACRAL REGION (HCC): ICD-10-CM

## 2019-11-23 DIAGNOSIS — M54.50 CHRONIC BILATERAL LOW BACK PAIN WITHOUT SCIATICA: ICD-10-CM

## 2019-11-25 RX ORDER — HYDROCODONE BITARTRATE AND ACETAMINOPHEN 5; 325 MG/1; MG/1
1 TABLET ORAL 2 TIMES DAILY
Qty: 60 TABLET | Refills: 0 | Status: SHIPPED | OUTPATIENT
Start: 2019-11-25 | End: 2019-12-24 | Stop reason: SDUPTHER

## 2019-12-04 ENCOUNTER — OFFICE VISIT (OUTPATIENT)
Dept: PRIMARY CARE CLINIC | Age: 59
End: 2019-12-04
Payer: COMMERCIAL

## 2019-12-04 VITALS
HEART RATE: 98 BPM | DIASTOLIC BLOOD PRESSURE: 72 MMHG | OXYGEN SATURATION: 99 % | WEIGHT: 203.4 LBS | BODY MASS INDEX: 31.86 KG/M2 | SYSTOLIC BLOOD PRESSURE: 134 MMHG

## 2019-12-04 DIAGNOSIS — M45.7 ANKYLOSING SPONDYLITIS OF LUMBOSACRAL REGION (HCC): Primary | ICD-10-CM

## 2019-12-04 DIAGNOSIS — I10 ESSENTIAL HYPERTENSION: ICD-10-CM

## 2019-12-04 DIAGNOSIS — Z12.5 PROSTATE CANCER SCREENING: ICD-10-CM

## 2019-12-04 DIAGNOSIS — E78.9 LIPID DISORDER: ICD-10-CM

## 2019-12-04 DIAGNOSIS — M53.3 SACROILIAC JOINT DYSFUNCTION: ICD-10-CM

## 2019-12-04 LAB
A/G RATIO: 1.4 (ref 1.1–2.2)
ALBUMIN SERPL-MCNC: 4.3 G/DL (ref 3.4–5)
ALP BLD-CCNC: 125 U/L (ref 40–129)
ALT SERPL-CCNC: 20 U/L (ref 10–40)
ANION GAP SERPL CALCULATED.3IONS-SCNC: 13 MMOL/L (ref 3–16)
AST SERPL-CCNC: 18 U/L (ref 15–37)
BASOPHILS ABSOLUTE: 0.1 K/UL (ref 0–0.2)
BASOPHILS RELATIVE PERCENT: 1.2 %
BILIRUB SERPL-MCNC: 0.4 MG/DL (ref 0–1)
BUN BLDV-MCNC: 15 MG/DL (ref 7–20)
CALCIUM SERPL-MCNC: 10.3 MG/DL (ref 8.3–10.6)
CHLORIDE BLD-SCNC: 96 MMOL/L (ref 99–110)
CHOLESTEROL, TOTAL: 130 MG/DL (ref 0–199)
CO2: 25 MMOL/L (ref 21–32)
CREAT SERPL-MCNC: 0.9 MG/DL (ref 0.9–1.3)
EOSINOPHILS ABSOLUTE: 0.2 K/UL (ref 0–0.6)
EOSINOPHILS RELATIVE PERCENT: 2.9 %
GFR AFRICAN AMERICAN: >60
GFR NON-AFRICAN AMERICAN: >60
GLOBULIN: 3.1 G/DL
GLUCOSE BLD-MCNC: 102 MG/DL (ref 70–99)
HCT VFR BLD CALC: 44.8 % (ref 40.5–52.5)
HDLC SERPL-MCNC: 42 MG/DL (ref 40–60)
HEMOGLOBIN: 14.8 G/DL (ref 13.5–17.5)
LDL CHOLESTEROL CALCULATED: 68 MG/DL
LYMPHOCYTES ABSOLUTE: 1.9 K/UL (ref 1–5.1)
LYMPHOCYTES RELATIVE PERCENT: 23.9 %
MCH RBC QN AUTO: 28.2 PG (ref 26–34)
MCHC RBC AUTO-ENTMCNC: 33 G/DL (ref 31–36)
MCV RBC AUTO: 85.4 FL (ref 80–100)
MONOCYTES ABSOLUTE: 0.5 K/UL (ref 0–1.3)
MONOCYTES RELATIVE PERCENT: 7 %
NEUTROPHILS ABSOLUTE: 5.1 K/UL (ref 1.7–7.7)
NEUTROPHILS RELATIVE PERCENT: 65 %
PDW BLD-RTO: 14 % (ref 12.4–15.4)
PLATELET # BLD: 336 K/UL (ref 135–450)
PMV BLD AUTO: 7.1 FL (ref 5–10.5)
POTASSIUM SERPL-SCNC: 4.5 MMOL/L (ref 3.5–5.1)
PROSTATE SPECIFIC ANTIGEN: 2 NG/ML (ref 0–4)
RBC # BLD: 5.24 M/UL (ref 4.2–5.9)
SODIUM BLD-SCNC: 134 MMOL/L (ref 136–145)
TOTAL PROTEIN: 7.4 G/DL (ref 6.4–8.2)
TRIGL SERPL-MCNC: 102 MG/DL (ref 0–150)
VLDLC SERPL CALC-MCNC: 20 MG/DL
WBC # BLD: 7.8 K/UL (ref 4–11)

## 2019-12-04 PROCEDURE — 90686 IIV4 VACC NO PRSV 0.5 ML IM: CPT | Performed by: FAMILY MEDICINE

## 2019-12-04 PROCEDURE — 3017F COLORECTAL CA SCREEN DOC REV: CPT | Performed by: FAMILY MEDICINE

## 2019-12-04 PROCEDURE — 99214 OFFICE O/P EST MOD 30 MIN: CPT | Performed by: FAMILY MEDICINE

## 2019-12-04 PROCEDURE — G8427 DOCREV CUR MEDS BY ELIG CLIN: HCPCS | Performed by: FAMILY MEDICINE

## 2019-12-04 PROCEDURE — G8598 ASA/ANTIPLAT THER USED: HCPCS | Performed by: FAMILY MEDICINE

## 2019-12-04 PROCEDURE — 4004F PT TOBACCO SCREEN RCVD TLK: CPT | Performed by: FAMILY MEDICINE

## 2019-12-04 PROCEDURE — 90471 IMMUNIZATION ADMIN: CPT | Performed by: FAMILY MEDICINE

## 2019-12-04 PROCEDURE — G8482 FLU IMMUNIZE ORDER/ADMIN: HCPCS | Performed by: FAMILY MEDICINE

## 2019-12-04 PROCEDURE — G8417 CALC BMI ABV UP PARAM F/U: HCPCS | Performed by: FAMILY MEDICINE

## 2019-12-04 ASSESSMENT — ENCOUNTER SYMPTOMS
SHORTNESS OF BREATH: 0
NAUSEA: 0
APNEA: 1
SORE THROAT: 0
VOMITING: 0
DIARRHEA: 0
BACK PAIN: 1
RECTAL PAIN: 0
WHEEZING: 0

## 2019-12-05 LAB
ESTIMATED AVERAGE GLUCOSE: 116.9 MG/DL
HBA1C MFR BLD: 5.7 %

## 2019-12-24 DIAGNOSIS — M45.7 ANKYLOSING SPONDYLITIS OF LUMBOSACRAL REGION (HCC): ICD-10-CM

## 2019-12-24 DIAGNOSIS — M54.50 CHRONIC BILATERAL LOW BACK PAIN WITHOUT SCIATICA: ICD-10-CM

## 2019-12-24 DIAGNOSIS — M51.37 DDD (DEGENERATIVE DISC DISEASE), LUMBOSACRAL: ICD-10-CM

## 2019-12-24 DIAGNOSIS — G89.29 CHRONIC BILATERAL LOW BACK PAIN WITHOUT SCIATICA: ICD-10-CM

## 2019-12-26 DIAGNOSIS — E78.00 HYPERCHOLESTEROLEMIA: ICD-10-CM

## 2019-12-29 RX ORDER — ATORVASTATIN CALCIUM 20 MG/1
20 TABLET, FILM COATED ORAL DAILY
Qty: 90 TABLET | Refills: 1 | Status: SHIPPED | OUTPATIENT
Start: 2019-12-29 | End: 2020-04-22 | Stop reason: SDUPTHER

## 2019-12-30 RX ORDER — HYDROCODONE BITARTRATE AND ACETAMINOPHEN 5; 325 MG/1; MG/1
1 TABLET ORAL 2 TIMES DAILY
Qty: 60 TABLET | Refills: 0 | Status: SHIPPED | OUTPATIENT
Start: 2019-12-30 | End: 2020-02-07 | Stop reason: SDUPTHER

## 2020-02-06 NOTE — TELEPHONE ENCOUNTER
Medication:   Requested Prescriptions     Pending Prescriptions Disp Refills    HYDROcodone-acetaminophen (NORCO) 5-325 MG per tablet 60 tablet 0     Sig: Take 1 tablet by mouth 2 times daily for 30 days. Last Filled:      Patient Phone Number: [phone] (home)     Last appt: 12/4/2019   Next appt: 3/4/2020    Last OARRS:   RX Monitoring 11/13/2017   Attestation The Prescription Monitoring Report for this patient was reviewed today.    Periodic Controlled Substance Monitoring -       Preferred Pharmacy:   [facility] [address] [phone] Cuellar Co[place] [phone]  [address] [address]  Phone: [phone] Fax: [phone]    [facility] [facility] [address], [address] [place] [id number]  Phone: [phone] Fax: [phone]    [address], [address] [facility] [phone] - F [phone]  [address] [place] [address]  Phone: [phone] Fax: [phone]

## 2020-02-07 RX ORDER — HYDROCODONE BITARTRATE AND ACETAMINOPHEN 5; 325 MG/1; MG/1
1 TABLET ORAL 2 TIMES DAILY
Qty: 60 TABLET | Refills: 0 | Status: SHIPPED | OUTPATIENT
Start: 2020-02-07 | End: 2020-03-04 | Stop reason: SDUPTHER

## 2020-03-04 ENCOUNTER — OFFICE VISIT (OUTPATIENT)
Dept: PRIMARY CARE CLINIC | Age: 60
End: 2020-03-04
Payer: COMMERCIAL

## 2020-03-04 VITALS
WEIGHT: 205.2 LBS | SYSTOLIC BLOOD PRESSURE: 126 MMHG | OXYGEN SATURATION: 98 % | HEART RATE: 88 BPM | DIASTOLIC BLOOD PRESSURE: 68 MMHG | BODY MASS INDEX: 32.14 KG/M2

## 2020-03-04 PROCEDURE — G8417 CALC BMI ABV UP PARAM F/U: HCPCS | Performed by: FAMILY MEDICINE

## 2020-03-04 PROCEDURE — G8482 FLU IMMUNIZE ORDER/ADMIN: HCPCS | Performed by: FAMILY MEDICINE

## 2020-03-04 PROCEDURE — G8926 SPIRO NO PERF OR DOC: HCPCS | Performed by: FAMILY MEDICINE

## 2020-03-04 PROCEDURE — 3023F SPIROM DOC REV: CPT | Performed by: FAMILY MEDICINE

## 2020-03-04 PROCEDURE — 90732 PPSV23 VACC 2 YRS+ SUBQ/IM: CPT | Performed by: FAMILY MEDICINE

## 2020-03-04 PROCEDURE — 3017F COLORECTAL CA SCREEN DOC REV: CPT | Performed by: FAMILY MEDICINE

## 2020-03-04 PROCEDURE — G8427 DOCREV CUR MEDS BY ELIG CLIN: HCPCS | Performed by: FAMILY MEDICINE

## 2020-03-04 PROCEDURE — 90471 IMMUNIZATION ADMIN: CPT | Performed by: FAMILY MEDICINE

## 2020-03-04 PROCEDURE — 4004F PT TOBACCO SCREEN RCVD TLK: CPT | Performed by: FAMILY MEDICINE

## 2020-03-04 PROCEDURE — 99214 OFFICE O/P EST MOD 30 MIN: CPT | Performed by: FAMILY MEDICINE

## 2020-03-04 RX ORDER — HYDROCODONE BITARTRATE AND ACETAMINOPHEN 5; 325 MG/1; MG/1
1 TABLET ORAL 2 TIMES DAILY
Qty: 60 TABLET | Refills: 0 | Status: SHIPPED | OUTPATIENT
Start: 2020-03-08 | End: 2020-04-22 | Stop reason: SDUPTHER

## 2020-03-04 ASSESSMENT — PATIENT HEALTH QUESTIONNAIRE - PHQ9
SUM OF ALL RESPONSES TO PHQ QUESTIONS 1-9: 0
1. LITTLE INTEREST OR PLEASURE IN DOING THINGS: 0
SUM OF ALL RESPONSES TO PHQ9 QUESTIONS 1 & 2: 0
2. FEELING DOWN, DEPRESSED OR HOPELESS: 0
SUM OF ALL RESPONSES TO PHQ QUESTIONS 1-9: 0

## 2020-03-04 ASSESSMENT — ENCOUNTER SYMPTOMS
EYES NEGATIVE: 1
CHEST TIGHTNESS: 0
CHOKING: 0
BACK PAIN: 1
ABDOMINAL PAIN: 0
APNEA: 1
WHEEZING: 0
ALLERGIC/IMMUNOLOGIC NEGATIVE: 1
COUGH: 0
SHORTNESS OF BREATH: 0

## 2020-03-04 NOTE — PROGRESS NOTES
SUBJECTIVE:  Patient ID: Vivek Li is a 61 y.o. male. Chief Complaint:  Chief Complaint   Patient presents with    Back Pain    Hypertension    Hyperlipidemia       HPI   61years old male  Chronic pain Dx DDD cervical & lumbar spine + Ankylosing Spondylitis  +SSD  Hypertension compliant with Rx no side effect  Hyperlipidemia compliant with Rx no side effect  Sleep Apnea compliant with CPAP  Tobacco use Dx COPD  Not ready to d/c tobacco Aware of risk & complications    Past Medical History:   Diagnosis Date    CVA (cerebral infarction)     hosp x 2 days [address]    Hypertension     Sleep apnea      No past surgical history on file. No Known Allergies  Family History   Problem Relation Age of Onset    High Blood Pressure Mother          age 68    High Blood Pressure Father          age 47    High Cholesterol Father    Juan Mullins Cancer Sister         Brain age 79    Sleep Apnea Sister    Juan Mullins Cancer Brother          age 79    Sleep Apnea Brother     Heart Attack Brother         age 52    Cancer Brother         age 8o >neck recovered     Social History     Patient does not qualify to have social determinant information on file (likely too young).    Social History Narrative    3 G children    Youngest son 25 y o lives with him    Wife +FT job she will retire        Patient Active Problem List   Diagnosis    History of CVA (cerebrovascular accident)    Sleep apnea    [address]yl[address]yl[address] ([facility] [address]    Hypertension    Tobacco abuse    DDD (degenerative disc disease), cervical    DDD (degenerative disc disease), lumbosacral    Sacroiliac joint dysfunction    Chronic back pain    Family history of coronary artery disease    Vitamin D deficiency    Diverticulosis of intestine without bleeding    Coronary artery calcification    Emphysema/COPD (HCC)    Adenomatous polyp    Lipid disorder     Current Outpatient Medications   Medication Sig Dispense Refill    Rate and Rhythm: Normal rate and regular rhythm. Pulses: Normal pulses. Heart sounds: Normal heart sounds. Pulmonary:      Effort: Pulmonary effort is normal.      Breath sounds: Normal breath sounds. No wheezing, rhonchi or rales. Musculoskeletal:         General: No swelling or deformity. Right lower leg: No edema. Left lower leg: No edema. Comments: Straight leg up 90 degree bilateral  Back no point of tenderness   Skin:     Findings: No erythema. Neurological:      Mental Status: He is alert and oriented to person, place, and time. Comments: DTR 2/4 equal bilateral   Psychiatric:         Mood and Affect: Mood normal.         Behavior: Behavior normal.         Thought Content: Thought content normal.         Judgment: Judgment normal.         ASSESSMENT/PLAN:      Diagnosis Orders   1. Chronic bilateral low back pain without sciatica  HYDROcodone-acetaminophen (NORCO) 5-325 MG per tablet   2. DDD (degenerative disc disease), lumbosacral  HYDROcodone-acetaminophen (NORCO) 5-325 MG per tablet   3. Ankylosing spondylitis of lumbosacral region (HCC)  HYDROcodone-acetaminophen (NORCO) 5-325 MG per tablet   4. Need for pneumococcal vaccination  Pneumococcal polysaccharide vaccine 23-valent greater than or equal to 1yo subcutaneous/IM   5. Tobacco use     6.  Pulmonary emphysema, unspecified emphysema type ([address]       COPD stable no wheeze or dyspnea  Advice d/c tobacco Pt is aware of risk & complications of Tobacco  Declined tobacco cessation Rx Nicorette or Chantix  Pneumovax 23  Oarrs check & review done

## 2020-03-10 ENCOUNTER — PATIENT MESSAGE (OUTPATIENT)
Dept: PRIMARY CARE CLINIC | Age: 60
End: 2020-03-10

## 2020-03-10 RX ORDER — HYDROCODONE BITARTRATE AND ACETAMINOPHEN 5; 325 MG/1; MG/1
1 TABLET ORAL 2 TIMES DAILY
Qty: 60 TABLET | Refills: 0 | Status: CANCELLED | OUTPATIENT
Start: 2020-03-10 | End: 2020-04-09

## 2020-03-10 NOTE — TELEPHONE ENCOUNTER
Medication:   Requested Prescriptions     Pending Prescriptions Disp Refills    HYDROcodone-acetaminophen (NORCO) 5-325 MG per tablet 60 tablet 0     Sig: Take 1 tablet by mouth 2 times daily for 30 days. Last Filled:    Last appt: 3/4/2020   Next appt: 6/5/2020    Last OARRS:   RX Monitoring 11/13/2017   Attestation The Prescription Monitoring Report for this patient was reviewed today.    Periodic Controlled Substance Monitoring -

## 2020-04-21 ENCOUNTER — PATIENT MESSAGE (OUTPATIENT)
Dept: PRIMARY CARE CLINIC | Age: 60
End: 2020-04-21

## 2020-04-21 NOTE — TELEPHONE ENCOUNTER
Medication:   Requested Prescriptions     Pending Prescriptions Disp Refills    atorvastatin (LIPITOR) 20 MG tablet 90 tablet 1     Sig: Take 1 tablet by mouth daily    amLODIPine-valsartan-HCTZ (EXFORGE HCT) -12.5 MG TABS 90 tablet 1     Sig: One tablet qd    HYDROcodone-acetaminophen (NORCO) 5-325 MG per tablet 60 tablet 0     Sig: Take 1 tablet by mouth 2 times daily for 30 days. Last Filled:  12/29/2019    Patient Phone Number: [phone] (home)     Last appt: 3/4/2020   Next appt: 6/5/2020    Last BMP:   Lab Results   Component Value Date     12/04/2019    K 4.5 12/04/2019    CL 96 12/04/2019    CO2 25 12/04/2019    ANIONGAP 13 12/04/2019    GLUCOSE 102 12/04/2019    BUN 15 12/04/2019    CREATININE 0.9 12/04/2019    LABGLOM >60 12/04/2019    LABGLOM 96 10/23/2017    GFRAA >60 12/04/2019    CALCIUM 10.3 12/04/2019      Last CMP:   Lab Results   Component Value Date     12/04/2019    K 4.5 12/04/2019    CL 96 12/04/2019    CO2 25 12/04/2019    ANIONGAP 13 12/04/2019    GLUCOSE 102 12/04/2019    BUN 15 12/04/2019    CREATININE 0.9 12/04/2019    LABGLOM >60 12/04/2019    LABGLOM 96 10/23/2017    GFRAA >60 12/04/2019    PROT 7.4 12/04/2019    LABALBU 4.3 12/04/2019    AGRATIO 1.4 12/04/2019    BILITOT 0.4 12/04/2019    ALKPHOS 125 12/04/2019    ALT 20 12/04/2019    AST 18 12/04/2019    GLOB 3.1 12/04/2019     Last Renal Function:   Lab Results   Component Value Date     12/04/2019    K 4.5 12/04/2019    CL 96 12/04/2019    CO2 25 12/04/2019    GLUCOSE 102 12/04/2019    BUN 15 12/04/2019    CREATININE 0.9 12/04/2019    LABALBU 4.3 12/04/2019    CALCIUM 10.3 12/04/2019    GFRAA >60 12/04/2019       Last OARRS:   RX Monitoring 11/13/2017   Attestation The Prescription Monitoring Report for this patient was reviewed today.    Periodic Controlled Substance Monitoring -       Preferred Pharmacy:   [address] [place] [address] [phone] - F

## 2020-04-22 RX ORDER — ATORVASTATIN CALCIUM 20 MG/1
20 TABLET, FILM COATED ORAL DAILY
Qty: 90 TABLET | Refills: 1 | Status: SHIPPED | OUTPATIENT
Start: 2020-04-22 | End: 2020-04-27 | Stop reason: SDUPTHER

## 2020-04-22 RX ORDER — HYDROCODONE BITARTRATE AND ACETAMINOPHEN 5; 325 MG/1; MG/1
1 TABLET ORAL 2 TIMES DAILY
Qty: 60 TABLET | Refills: 0 | Status: SHIPPED | OUTPATIENT
Start: 2020-04-22 | End: 2020-05-06 | Stop reason: SDUPTHER

## 2020-04-22 RX ORDER — AMLODIPINE, VALSARTAN AND HYDROCHLOROTHIAZIDE 10; 160; 12.5 MG/1; MG/1; MG/1
TABLET ORAL
Qty: 90 TABLET | Refills: 1 | Status: SHIPPED | OUTPATIENT
Start: 2020-04-22 | End: 2020-04-27 | Stop reason: SDUPTHER

## 2020-04-27 RX ORDER — AMLODIPINE, VALSARTAN AND HYDROCHLOROTHIAZIDE 10; 160; 12.5 MG/1; MG/1; MG/1
TABLET ORAL
Qty: 90 TABLET | Refills: 1 | Status: SHIPPED | OUTPATIENT
Start: 2020-04-27 | End: 2020-05-06 | Stop reason: SDUPTHER

## 2020-04-27 RX ORDER — ATORVASTATIN CALCIUM 20 MG/1
20 TABLET, FILM COATED ORAL DAILY
Qty: 90 TABLET | Refills: 1 | Status: SHIPPED | OUTPATIENT
Start: 2020-04-27 | End: 2020-05-06 | Stop reason: SDUPTHER

## 2020-05-04 ENCOUNTER — PATIENT MESSAGE (OUTPATIENT)
Dept: PRIMARY CARE CLINIC | Age: 60
End: 2020-05-04

## 2020-05-06 RX ORDER — ATORVASTATIN CALCIUM 20 MG/1
20 TABLET, FILM COATED ORAL DAILY
Qty: 90 TABLET | Refills: 1 | Status: SHIPPED | OUTPATIENT
Start: 2020-05-06 | End: 2021-06-14 | Stop reason: SDUPTHER

## 2020-05-06 RX ORDER — AMLODIPINE, VALSARTAN AND HYDROCHLOROTHIAZIDE 10; 160; 12.5 MG/1; MG/1; MG/1
TABLET ORAL
Qty: 90 TABLET | Refills: 1 | Status: SHIPPED | OUTPATIENT
Start: 2020-05-06 | End: 2020-12-14 | Stop reason: SDUPTHER

## 2020-05-06 RX ORDER — HYDROCODONE BITARTRATE AND ACETAMINOPHEN 5; 325 MG/1; MG/1
1 TABLET ORAL 2 TIMES DAILY
Qty: 60 TABLET | Refills: 0 | Status: SHIPPED | OUTPATIENT
Start: 2020-05-06 | End: 2020-06-10 | Stop reason: SDUPTHER

## 2020-06-04 ENCOUNTER — TELEPHONE (OUTPATIENT)
Dept: PRIMARY CARE CLINIC | Age: 60
End: 2020-06-04

## 2020-06-04 NOTE — TELEPHONE ENCOUNTER
[facility] received a call from:    Name of Caller: Pt    Relationship to patient: Self    Organization name: n/a    Best contact number: [phone]    Reason for call: Pt called and wants to reschedule his appointment if so for next Friday early as possible.  Please advise

## 2020-06-09 ENCOUNTER — PATIENT MESSAGE (OUTPATIENT)
Dept: PRIMARY CARE CLINIC | Age: 60
End: 2020-06-09

## 2020-06-10 RX ORDER — HYDROCODONE BITARTRATE AND ACETAMINOPHEN 5; 325 MG/1; MG/1
1 TABLET ORAL 2 TIMES DAILY
Qty: 60 TABLET | Refills: 0 | Status: SHIPPED | OUTPATIENT
Start: 2020-06-10 | End: 2020-07-22 | Stop reason: SDUPTHER

## 2020-06-12 ENCOUNTER — OFFICE VISIT (OUTPATIENT)
Dept: PRIMARY CARE CLINIC | Age: 60
End: 2020-06-12
Payer: MEDICARE

## 2020-06-12 VITALS
SYSTOLIC BLOOD PRESSURE: 134 MMHG | HEART RATE: 106 BPM | OXYGEN SATURATION: 99 % | DIASTOLIC BLOOD PRESSURE: 74 MMHG | TEMPERATURE: 98.4 F | BODY MASS INDEX: 32.62 KG/M2 | WEIGHT: 207.8 LBS | HEIGHT: 67 IN

## 2020-06-12 PROCEDURE — 99214 OFFICE O/P EST MOD 30 MIN: CPT | Performed by: FAMILY MEDICINE

## 2020-06-12 PROCEDURE — 3017F COLORECTAL CA SCREEN DOC REV: CPT | Performed by: FAMILY MEDICINE

## 2020-06-12 PROCEDURE — G8417 CALC BMI ABV UP PARAM F/U: HCPCS | Performed by: FAMILY MEDICINE

## 2020-06-12 PROCEDURE — 4004F PT TOBACCO SCREEN RCVD TLK: CPT | Performed by: FAMILY MEDICINE

## 2020-06-12 PROCEDURE — G8427 DOCREV CUR MEDS BY ELIG CLIN: HCPCS | Performed by: FAMILY MEDICINE

## 2020-06-12 ASSESSMENT — ENCOUNTER SYMPTOMS
APNEA: 0
WHEEZING: 0
SHORTNESS OF BREATH: 0
EYES NEGATIVE: 1
BACK PAIN: 1

## 2020-06-12 NOTE — PROGRESS NOTES
Pain specialist  No injection  Chiropractor in past    Allergic/Immunologic: Negative for environmental allergies. Neurological: Negative for dizziness, light-headedness and headaches. Hematological: Negative. Psychiatric/Behavioral: Negative for behavioral problems, confusion, self-injury and sleep disturbance. The patient is not hyperactive. Wife is retired  Youngest son 32 y o help around       OBJECTIVE:  /74 (Site: Left Upper Arm, Position: Sitting, Cuff Size: Medium Adult)   Pulse 106   Temp 98.4 °F (36.9 °C) (Oral)   Ht 5' 7\" (1.702 m)   Wt 207 lb 12.8 oz (94.3 kg)   SpO2 99%   BMI 32.55 kg/m²   Physical Exam  Vitals signs reviewed. Constitutional:       General: He is not in acute distress. Appearance: He is well-developed. He is obese. He is not diaphoretic. Comments: Stable  Little exercise every am  Walk  Take deep breath   HENT:      Head: Normocephalic and atraumatic. Right Ear: External ear normal.      Left Ear: External ear normal.      Nose: Nose normal.      Mouth/Throat:      Pharynx: No oropharyngeal exudate. Eyes:      General:         Right eye: No discharge. Left eye: No discharge. Conjunctiva/sclera: Conjunctivae normal.      Pupils: Pupils are equal, round, and reactive to light. Neck:      Musculoskeletal: Normal range of motion and neck supple. Thyroid: No thyromegaly. Cardiovascular:      Rate and Rhythm: Normal rate and regular rhythm. Pulses: Normal pulses. Heart sounds: Normal heart sounds. No murmur. Pulmonary:      Effort: Pulmonary effort is normal. No respiratory distress. Breath sounds: Normal breath sounds. No wheezing or rales. Chest:      Chest wall: No tenderness. Abdominal:      General: Bowel sounds are normal. There is no distension. Palpations: Abdomen is soft. There is no mass. Tenderness: There is no abdominal tenderness. There is no guarding or rebound.    Musculoskeletal:

## 2020-06-18 LAB
6-ACETYLMORPHINE: NOT DETECTED
7-AMINOCLONAZEPAM: NOT DETECTED
ALPHA-OH-ALPRAZOLAM: NOT DETECTED
ALPRAZOLAM: NOT DETECTED
AMPHETAMINE: NOT DETECTED
BARBITURATES: NOT DETECTED
BENZOYLECGONINE: NOT DETECTED
BUPRENORPHINE: NOT DETECTED
CARISOPRODOL: NOT DETECTED
CLONAZEPAM: NOT DETECTED
CODEINE: NOT DETECTED
CREATININE URINE: 66.1 MG/DL (ref 20–400)
DIAZEPAM: NOT DETECTED
DRUGS EXPECTED: NORMAL
EER PAIN MGT DRUG PANEL, HIGH RES/EMIT U: NORMAL
ETHYL GLUCURONIDE: PRESENT
FENTANYL: NOT DETECTED
HYDROCODONE: NOT DETECTED
HYDROMORPHONE: NOT DETECTED
LORAZEPAM: NOT DETECTED
MARIJUANA METABOLITE: NOT DETECTED
MDA: NOT DETECTED
MDEA: NOT DETECTED
MDMA URINE: NOT DETECTED
MEPERIDINE: NOT DETECTED
METHADONE: NOT DETECTED
METHAMPHETAMINE: NOT DETECTED
METHYLPHENIDATE: NOT DETECTED
MIDAZOLAM: NOT DETECTED
MORPHINE: NOT DETECTED
NORBUPRENORPHINE, FREE: NOT DETECTED
NORDIAZEPAM: NOT DETECTED
NORFENTANYL: NOT DETECTED
NORHYDROCODONE, URINE: NOT DETECTED
NOROXYCODONE: NOT DETECTED
NOROXYMORPHONE, URINE: NOT DETECTED
OXAZEPAM: NOT DETECTED
OXYCODONE: NOT DETECTED
OXYMORPHONE: NOT DETECTED
PAIN MANAGEMENT DRUG PANEL: NORMAL
PAIN MANAGEMENT DRUG PANEL: NORMAL
PCP: NOT DETECTED
PHENTERMINE: NOT DETECTED
PROPOXYPHENE: NOT DETECTED
TAPENTADOL, URINE: NOT DETECTED
TAPENTADOL-O-SULFATE, URINE: NOT DETECTED
TEMAZEPAM: NOT DETECTED
TRAMADOL: NOT DETECTED
ZOLPIDEM: NOT DETECTED

## 2020-09-09 ENCOUNTER — PATIENT MESSAGE (OUTPATIENT)
Dept: PRIMARY CARE CLINIC | Age: 60
End: 2020-09-09

## 2020-09-09 RX ORDER — HYDROCODONE BITARTRATE AND ACETAMINOPHEN 5; 325 MG/1; MG/1
1 TABLET ORAL 2 TIMES DAILY
Qty: 60 TABLET | Refills: 0 | Status: SHIPPED | OUTPATIENT
Start: 2020-09-09 | End: 2020-10-14 | Stop reason: SDUPTHER

## 2020-09-09 NOTE — TELEPHONE ENCOUNTER
From: Rosi Stephens  To: Jie [place] MD  Sent: 9/9/2020 11:58 AM EDT  Subject: Prescription Question    I need a refill on my pain meds please. Thank you.

## 2020-09-09 NOTE — TELEPHONE ENCOUNTER
Medication:   Requested Prescriptions     Pending Prescriptions Disp Refills    HYDROcodone-acetaminophen (NORCO) 5-325 MG per tablet 60 tablet 0     Sig: Take 1 tablet by mouth 2 times daily for 30 days. Last Filled:  7.22.20  Last appt: 6/12/2020   Next appt: 9/14/2020    Last OARRS:   RX Monitoring 7/22/2020   Attestation -   Periodic Controlled Substance Monitoring No signs of potential drug abuse or diversion identified.

## 2020-09-14 ENCOUNTER — OFFICE VISIT (OUTPATIENT)
Dept: PRIMARY CARE CLINIC | Age: 60
End: 2020-09-14
Payer: MEDICARE

## 2020-09-14 VITALS
WEIGHT: 206.4 LBS | OXYGEN SATURATION: 98 % | HEART RATE: 115 BPM | HEIGHT: 67 IN | DIASTOLIC BLOOD PRESSURE: 77 MMHG | TEMPERATURE: 97.6 F | SYSTOLIC BLOOD PRESSURE: 138 MMHG | BODY MASS INDEX: 32.39 KG/M2

## 2020-09-14 PROCEDURE — 99214 OFFICE O/P EST MOD 30 MIN: CPT | Performed by: FAMILY MEDICINE

## 2020-09-14 PROCEDURE — G8427 DOCREV CUR MEDS BY ELIG CLIN: HCPCS | Performed by: FAMILY MEDICINE

## 2020-09-14 PROCEDURE — G8417 CALC BMI ABV UP PARAM F/U: HCPCS | Performed by: FAMILY MEDICINE

## 2020-09-14 PROCEDURE — 4004F PT TOBACCO SCREEN RCVD TLK: CPT | Performed by: FAMILY MEDICINE

## 2020-09-14 PROCEDURE — 3017F COLORECTAL CA SCREEN DOC REV: CPT | Performed by: FAMILY MEDICINE

## 2020-09-14 ASSESSMENT — ENCOUNTER SYMPTOMS
PHOTOPHOBIA: 0
RHINORRHEA: 0
CHEST TIGHTNESS: 0
EYE PAIN: 0
COUGH: 0
EYE DISCHARGE: 0
EYE REDNESS: 0
NAUSEA: 0
VOICE CHANGE: 0
CHOKING: 0
BLOOD IN STOOL: 0
WHEEZING: 0
CONSTIPATION: 0
EYES NEGATIVE: 1
COLOR CHANGE: 0
SINUS PRESSURE: 0
SINUS PAIN: 0
SORE THROAT: 0
BACK PAIN: 1
EYE ITCHING: 0
ABDOMINAL PAIN: 0
ALLERGIC/IMMUNOLOGIC NEGATIVE: 1
VOMITING: 0
SHORTNESS OF BREATH: 0
ABDOMINAL DISTENTION: 0
TROUBLE SWALLOWING: 0
DIARRHEA: 0
APNEA: 0

## 2020-09-14 NOTE — PROGRESS NOTES
SUBJECTIVE:  Patient ID: sAh Grandchild is a 61 y.o. male. Chief Complaint:  Chief Complaint   Patient presents with    Back Pain    Hypertension    Hyperlipidemia       HPI   61year old Male  +SSD x 5 years  Dx Ankylosing Spondylitis Lumbosacral region + Sciatica Right   Level of pain varies from day to day average 6   According to pt difficult to pin point what make or factor makes pain level higher  He is able to help house hold Laundry ,dishes,sweeping   Rx Norco use as prn     Past Medical History:   Diagnosis Date    CVA (cerebral infarction)     hosp x 2 days [address]    Hypertension     Sleep apnea      No past surgical history on file.   No Known Allergies    Family History   Problem Relation Age of Onset    High Blood Pressure Mother          age 68    High Blood Pressure Father          age 47    High Cholesterol Father    [employer] Cancer Sister         Brain age 79    Sleep Apnea Sister    [employer] Cancer Brother          age 79    Sleep Apnea Brother     Heart Attack Brother         age 52    [de-identified] Brother         age 8o >neck recovered     Social History     Social History Narrative    3 G children    Youngest son 25 y o lives with him    Wife +FT job she will retire          Patient Active Problem List   Diagnosis    History of CVA (cerebrovascular accident)    Sleep apnea    [facility]yl[facility] ([facility] [address]    Hypertension    Tobacco abuse    DDD (degenerative disc disease), cervical    DDD (degenerative disc disease), lumbosacral    Sacroiliac joint dysfunction    Chronic back pain    Family history of coronary artery disease    Vitamin D deficiency    Diverticulosis of intestine without bleeding    Coronary artery calcification    Emphysema/COPD (HCC)    Adenomatous polyp    Lipid disorder     Current Outpatient Medications   Medication Sig Dispense Refill    HYDROcodone-acetaminophen (NORCO) 5-325 MG per tablet Take 1 tablet by mouth 2 times daily for 30 days. 60 tablet 0    atorvastatin (LIPITOR) 20 MG tablet Take 1 tablet by mouth daily 90 tablet 1    amLODIPine-valsartan-HCTZ (EXFORGE HCT) -12.5 MG TABS One tablet qd 90 tablet 1    Blood Pressure KIT Hypertension I10 1 kit 0    vitamin D (ERGOCALCIFEROL) 31969 UNITS CAPS capsule Take 1 capsule by mouth once a week 12 capsule 1    aspirin 325 MG tablet Take 325 mg by mouth daily. No current facility-administered medications for this visit.       Lab Results   Component Value Date    WBC 7.8 12/04/2019    HGB 14.8 12/04/2019    HCT 44.8 12/04/2019    MCV 85.4 12/04/2019     12/04/2019     Lab Results   Component Value Date    CHOL 130 12/04/2019    CHOL 189 10/17/2018    CHOL 164 (H) 10/23/2017     Lab Results   Component Value Date    TRIG 102 12/04/2019    TRIG 118 10/17/2018    TRIG 79 10/24/2016     Lab Results   Component Value Date    HDL 42 12/04/2019    HDL 45 10/17/2018    HDL 44 10/24/2016     Lab Results   Component Value Date    LDLCALC 68 12/04/2019    LDLCALC 120 (H) 10/17/2018    LDLCALC 114 10/24/2016     Lab Results   Component Value Date    LABVLDL 20 12/04/2019    LABVLDL 24 10/17/2018     Lab Results   Component Value Date    CHOLHDLRATIO 4.0 10/24/2016       Chemistry        Component Value Date/Time     (L) 12/04/2019 0935    K 4.5 12/04/2019 0935    CL 96 (L) 12/04/2019 0935    CO2 25 12/04/2019 0935    BUN 15 12/04/2019 0935    CREATININE 0.9 12/04/2019 0935        Component Value Date/Time    CALCIUM 10.3 12/04/2019 0935    ALKPHOS 125 12/04/2019 0935    AST 18 12/04/2019 0935    ALT 20 12/04/2019 0935    BILITOT 0.4 12/04/2019 0935        Lab Results   Component Value Date    PSA 2.00 12/04/2019    PSA 1.77 10/17/2018    PSA 1.1 10/24/2016     Lab Results   Component Value Date    TSH 1.11 10/23/2017     Lab Results   Component Value Date    LABA1C 5.7 12/04/2019     Lab Results   Component Value Date    .9 12/04/2019           Review of Systems Constitutional: Negative for activity change, appetite change, chills, diaphoresis, fatigue, fever and unexpected weight change. [address]: Negative. Negative for congestion, ear discharge, ear pain, hearing loss, nosebleeds, postnasal drip, rhinorrhea, sinus pressure, sinus pain, sore throat, tinnitus, trouble swallowing and voice change. Eyes: Negative. Negative for photophobia, pain, discharge, redness, itching and visual disturbance. Early cataract   Respiratory: Negative for apnea, cough, choking, chest tightness, shortness of breath and wheezing. CPAP use  +Tobacco   Cardiovascular: Negative for chest pain, palpitations and leg swelling. Gastrointestinal: Negative for abdominal distention, abdominal pain, blood in stool, constipation, diarrhea, nausea and vomiting. BM good   Endocrine: Negative. Negative for cold intolerance, heat intolerance, polydipsia, polyphagia and polyuria. Genitourinary: Negative for dysuria, flank pain, frequency and urgency. Musculoskeletal: Positive for back pain and neck pain. Negative for gait problem. Walk very slow & careful  He has Cane & walker  Use prn   Skin: Negative for color change and rash. Allergic/Immunologic: Negative. Negative for environmental allergies and food allergies. Neurological: Positive for headaches. Negative for dizziness, tremors, facial asymmetry, light-headedness and numbness. Headache rare   Take ASA with good result    Psychiatric/Behavioral: Negative for agitation, behavioral problems, decreased concentration, self-injury, sleep disturbance and suicidal ideas. The patient is not nervous/anxious and is not hyperactive.          Happy alec  Good support wife ,son  Neighbor        OBJECTIVE:  /77 (Site: Left Upper Arm, Position: Sitting, Cuff Size: Medium Adult)   Pulse 115   Temp 97.6 °F (36.4 °C) (Infrared)   Ht 5' 7\" (1.702 m)   Wt 206 lb 6.4 oz (93.6 kg)   SpO2 98%   BMI 32.33 kg/m²   Physical Exam  Constitutional:       Appearance: He is obese. HENT:      Head: Normocephalic. Eyes:      Extraocular Movements: Extraocular movements intact. Pupils: Pupils are equal, round, and reactive to light. Neck:      Musculoskeletal: Normal range of motion and neck supple. Cardiovascular:      Rate and Rhythm: Normal rate and regular rhythm. Pulmonary:      Effort: Pulmonary effort is normal.      Breath sounds: Normal breath sounds. Musculoskeletal:      Right lower leg: No edema. Left lower leg: No edema. Skin:     Findings: No erythema. Neurological:      General: No focal deficit present. Mental Status: He is alert and oriented to person, place, and time. Psychiatric:         Mood and Affect: Mood normal.         Behavior: Behavior normal.         Thought Content: Thought content normal.         Judgment: Judgment normal.         ASSESSMENT/PLAN:      Diagnosis Orders   1. Hypercholesterolemia     2. Essential hypertension     3. Tobacco use     4.  Ankylosing spondylitis of lumbosacral region [facility])           Flu shot Oct 2020 get MA appointment  He prefers post pone annual CT lung  Oarrs check & review  Visit 3 month   Due for CT lung   Due Lab update   Lab next visit

## 2020-10-13 NOTE — TELEPHONE ENCOUNTER
Medication:   Requested Prescriptions     Pending Prescriptions Disp Refills    HYDROcodone-acetaminophen (NORCO) 5-325 MG per tablet 60 tablet 0     Sig: Take 1 tablet by mouth 2 times daily for 30 days. Last Filled:      Patient Phone Number: [phone] (home)     Last appt: 9/14/2020   Next appt: 12/14/2020    Last OARRS:   RX Monitoring 9/14/2020   Attestation -   Periodic Controlled Substance Monitoring No signs of potential drug abuse or diversion identified.        Preferred Pharmacy:   [address] [place] [address].[address] [phone]  [address] [place] [address]  Phone: [phone] Fax: [phone]

## 2020-10-14 RX ORDER — HYDROCODONE BITARTRATE AND ACETAMINOPHEN 5; 325 MG/1; MG/1
1 TABLET ORAL 2 TIMES DAILY
Qty: 60 TABLET | Refills: 0 | Status: SHIPPED | OUTPATIENT
Start: 2020-10-14 | End: 2020-11-20 | Stop reason: SDUPTHER

## 2020-12-14 ENCOUNTER — OFFICE VISIT (OUTPATIENT)
Dept: PRIMARY CARE CLINIC | Age: 60
End: 2020-12-14
Payer: MEDICARE

## 2020-12-14 VITALS
TEMPERATURE: 97 F | BODY MASS INDEX: 32.51 KG/M2 | OXYGEN SATURATION: 99 % | WEIGHT: 207.6 LBS | HEART RATE: 98 BPM | DIASTOLIC BLOOD PRESSURE: 79 MMHG | SYSTOLIC BLOOD PRESSURE: 139 MMHG

## 2020-12-14 DIAGNOSIS — I10 ESSENTIAL HYPERTENSION: ICD-10-CM

## 2020-12-14 DIAGNOSIS — E55.9 VITAMIN D DEFICIENCY: ICD-10-CM

## 2020-12-14 DIAGNOSIS — R73.09 HIGH GLUCOSE: ICD-10-CM

## 2020-12-14 DIAGNOSIS — Z12.5 PROSTATE CANCER SCREENING: ICD-10-CM

## 2020-12-14 DIAGNOSIS — E78.9 LIPID DISORDER: ICD-10-CM

## 2020-12-14 DIAGNOSIS — Z11.59 ENCOUNTER FOR HCV SCREENING TEST FOR LOW RISK PATIENT: ICD-10-CM

## 2020-12-14 LAB
A/G RATIO: 1.5 (ref 1.1–2.2)
ALBUMIN SERPL-MCNC: 4.5 G/DL (ref 3.4–5)
ALP BLD-CCNC: 123 U/L (ref 40–129)
ALT SERPL-CCNC: 16 U/L (ref 10–40)
ANION GAP SERPL CALCULATED.3IONS-SCNC: 11 MMOL/L (ref 3–16)
AST SERPL-CCNC: 18 U/L (ref 15–37)
BASOPHILS ABSOLUTE: 0.1 K/UL (ref 0–0.2)
BASOPHILS RELATIVE PERCENT: 1.1 %
BILIRUB SERPL-MCNC: 0.4 MG/DL (ref 0–1)
BUN BLDV-MCNC: 14 MG/DL (ref 7–20)
CALCIUM SERPL-MCNC: 10 MG/DL (ref 8.3–10.6)
CHLORIDE BLD-SCNC: 101 MMOL/L (ref 99–110)
CHOLESTEROL, TOTAL: 160 MG/DL (ref 0–199)
CO2: 27 MMOL/L (ref 21–32)
CREAT SERPL-MCNC: 0.9 MG/DL (ref 0.8–1.3)
EOSINOPHILS ABSOLUTE: 0.2 K/UL (ref 0–0.6)
EOSINOPHILS RELATIVE PERCENT: 2.5 %
GFR AFRICAN AMERICAN: >60
GFR NON-AFRICAN AMERICAN: >60
GLOBULIN: 3.1 G/DL
GLUCOSE BLD-MCNC: 110 MG/DL (ref 70–99)
HCT VFR BLD CALC: 42.5 % (ref 40.5–52.5)
HDLC SERPL-MCNC: 35 MG/DL (ref 40–60)
HEMOGLOBIN: 14.6 G/DL (ref 13.5–17.5)
HEPATITIS C ANTIBODY INTERPRETATION: NORMAL
LDL CHOLESTEROL CALCULATED: 107 MG/DL
LYMPHOCYTES ABSOLUTE: 2 K/UL (ref 1–5.1)
LYMPHOCYTES RELATIVE PERCENT: 25.4 %
MCH RBC QN AUTO: 28.4 PG (ref 26–34)
MCHC RBC AUTO-ENTMCNC: 34.3 G/DL (ref 31–36)
MCV RBC AUTO: 82.9 FL (ref 80–100)
MONOCYTES ABSOLUTE: 0.5 K/UL (ref 0–1.3)
MONOCYTES RELATIVE PERCENT: 6.4 %
NEUTROPHILS ABSOLUTE: 5.1 K/UL (ref 1.7–7.7)
NEUTROPHILS RELATIVE PERCENT: 64.6 %
PDW BLD-RTO: 13.9 % (ref 12.4–15.4)
PLATELET # BLD: 353 K/UL (ref 135–450)
PMV BLD AUTO: 6.7 FL (ref 5–10.5)
POTASSIUM SERPL-SCNC: 4.5 MMOL/L (ref 3.5–5.1)
PROSTATE SPECIFIC ANTIGEN: 2.47 NG/ML (ref 0–4)
RBC # BLD: 5.12 M/UL (ref 4.2–5.9)
SODIUM BLD-SCNC: 139 MMOL/L (ref 136–145)
TOTAL PROTEIN: 7.6 G/DL (ref 6.4–8.2)
TRIGL SERPL-MCNC: 92 MG/DL (ref 0–150)
VITAMIN D 25-HYDROXY: 25 NG/ML
VLDLC SERPL CALC-MCNC: 18 MG/DL
WBC # BLD: 8 K/UL (ref 4–11)

## 2020-12-14 PROCEDURE — 90686 IIV4 VACC NO PRSV 0.5 ML IM: CPT | Performed by: FAMILY MEDICINE

## 2020-12-14 PROCEDURE — 4004F PT TOBACCO SCREEN RCVD TLK: CPT | Performed by: FAMILY MEDICINE

## 2020-12-14 PROCEDURE — G8482 FLU IMMUNIZE ORDER/ADMIN: HCPCS | Performed by: FAMILY MEDICINE

## 2020-12-14 PROCEDURE — G8427 DOCREV CUR MEDS BY ELIG CLIN: HCPCS | Performed by: FAMILY MEDICINE

## 2020-12-14 PROCEDURE — G8417 CALC BMI ABV UP PARAM F/U: HCPCS | Performed by: FAMILY MEDICINE

## 2020-12-14 PROCEDURE — 99214 OFFICE O/P EST MOD 30 MIN: CPT | Performed by: FAMILY MEDICINE

## 2020-12-14 PROCEDURE — 3017F COLORECTAL CA SCREEN DOC REV: CPT | Performed by: FAMILY MEDICINE

## 2020-12-14 PROCEDURE — G0008 ADMIN INFLUENZA VIRUS VAC: HCPCS | Performed by: FAMILY MEDICINE

## 2020-12-14 RX ORDER — AMLODIPINE, VALSARTAN AND HYDROCHLOROTHIAZIDE 10; 160; 12.5 MG/1; MG/1; MG/1
TABLET ORAL
Qty: 90 TABLET | Refills: 1 | Status: SHIPPED | OUTPATIENT
Start: 2020-12-14 | End: 2021-06-14 | Stop reason: SDUPTHER

## 2020-12-14 SDOH — ECONOMIC STABILITY: INCOME INSECURITY: HOW HARD IS IT FOR YOU TO PAY FOR THE VERY BASICS LIKE FOOD, HOUSING, MEDICAL CARE, AND HEATING?: NOT HARD AT ALL

## 2020-12-14 SDOH — ECONOMIC STABILITY: FOOD INSECURITY: WITHIN THE PAST 12 MONTHS, THE FOOD YOU BOUGHT JUST DIDN'T LAST AND YOU DIDN'T HAVE MONEY TO GET MORE.: NEVER TRUE

## 2020-12-14 SDOH — ECONOMIC STABILITY: TRANSPORTATION INSECURITY
IN THE PAST 12 MONTHS, HAS THE LACK OF TRANSPORTATION KEPT YOU FROM MEDICAL APPOINTMENTS OR FROM GETTING MEDICATIONS?: NO

## 2020-12-14 SDOH — ECONOMIC STABILITY: FOOD INSECURITY: WITHIN THE PAST 12 MONTHS, YOU WORRIED THAT YOUR FOOD WOULD RUN OUT BEFORE YOU GOT MONEY TO BUY MORE.: NEVER TRUE

## 2020-12-14 SDOH — ECONOMIC STABILITY: TRANSPORTATION INSECURITY
IN THE PAST 12 MONTHS, HAS LACK OF TRANSPORTATION KEPT YOU FROM MEETINGS, WORK, OR FROM GETTING THINGS NEEDED FOR DAILY LIVING?: NO

## 2020-12-14 ASSESSMENT — ENCOUNTER SYMPTOMS
SHORTNESS OF BREATH: 0
ABDOMINAL PAIN: 0
EYES NEGATIVE: 1
DIARRHEA: 0
SORE THROAT: 0
BACK PAIN: 1
CHEST TIGHTNESS: 0
APNEA: 1

## 2020-12-14 NOTE — LETTER
[address] [place] [address]  Phone: [phone]  Fax: [phone]    Missy Covington MD        December 14, 2020     Patient: Sonny Martell   YOB: 1960   Date of Visit: 12/14/2020       To Whom It May Concern: It is my medical opinion that Kevin Gomez requires a disability parking placard for the following reasons:  Unable to walk long distances. Duration of need: 5 years    If you have any questions or concerns, please don't hesitate to call.     Sincerely,        Missy Covington MD

## 2020-12-14 NOTE — PROGRESS NOTES
 Blood Pressure KIT Hypertension I10 1 kit 0    vitamin D (ERGOCALCIFEROL) 59168 UNITS CAPS capsule Take 1 capsule by mouth once a week 12 capsule 1    aspirin 325 MG tablet Take 325 mg by mouth daily. No current facility-administered medications for this visit. Lab Results   Component Value Date    WBC 7.8 12/04/2019    HGB 14.8 12/04/2019    HCT 44.8 12/04/2019    MCV 85.4 12/04/2019     12/04/2019     Lab Results   Component Value Date    CHOL 130 12/04/2019    CHOL 189 10/17/2018    CHOL 164 (H) 10/23/2017     Lab Results   Component Value Date    TRIG 102 12/04/2019    TRIG 118 10/17/2018    TRIG 79 10/24/2016     Lab Results   Component Value Date    HDL 42 12/04/2019    HDL 45 10/17/2018    HDL 44 10/24/2016     Lab Results   Component Value Date    LDLCALC 68 12/04/2019    LDLCALC 120 (H) 10/17/2018    LDLCALC 114 10/24/2016     Lab Results   Component Value Date    LABVLDL 20 12/04/2019    LABVLDL 24 10/17/2018     Lab Results   Component Value Date    CHOLHDLRATIO 4.0 10/24/2016       Chemistry        Component Value Date/Time     (L) 12/04/2019 0935    K 4.5 12/04/2019 0935    CL 96 (L) 12/04/2019 0935    CO2 25 12/04/2019 0935    BUN 15 12/04/2019 0935    CREATININE 0.9 12/04/2019 0935        Component Value Date/Time    CALCIUM 10.3 12/04/2019 0935    ALKPHOS 125 12/04/2019 0935    AST 18 12/04/2019 0935    ALT 20 12/04/2019 0935    BILITOT 0.4 12/04/2019 0935            Review of Systems   Constitutional: Negative for chills. Stable   Active but nothing strenous  Walk around   HENT: Negative for congestion and sore throat. Eyes: Negative. Respiratory: Positive for apnea. Negative for chest tightness and shortness of breath. Tobacco use  +CPAP   Cardiovascular: Negative for chest pain, palpitations and leg swelling. Gastrointestinal: Negative for abdominal pain and diarrhea. BM regular   Endocrine: Negative. Genitourinary: Negative for dysuria, frequency and urgency. Musculoskeletal: Positive for back pain and neck pain. Negative for gait problem and myalgias. Majority of pain Lower back   + Stiffness  + some Neck pain   Skin: Negative for rash. Neurological: Negative for dizziness, light-headedness and headaches. Hematological: Negative. Psychiatric/Behavioral: Negative for behavioral problems, self-injury and sleep disturbance. The patient is not nervous/anxious. One son 32year old live with them  [employer]       OBJECTIVE:  /79 (Site: Left Upper Arm, Position: Sitting, Cuff Size: Medium Adult)   Pulse 98   Temp 97 °F (36.1 °C) (Infrared)   Wt 207 lb 9.6 oz (94.2 kg)   SpO2 99%   BMI 32.51 kg/m²   Physical Exam  Constitutional:       Appearance: He is obese. HENT:      Head: Normocephalic. Eyes:      Extraocular Movements: Extraocular movements intact. Pupils: Pupils are equal, round, and reactive to light. Neck:      Musculoskeletal: Normal range of motion and neck supple. Cardiovascular:      Rate and Rhythm: Normal rate and regular rhythm. Pulmonary:      Effort: Pulmonary effort is normal.      Breath sounds: Normal breath sounds. Musculoskeletal:      Right lower leg: No edema. Left lower leg: No edema. Skin:     Findings: No erythema or rash. Neurological:      General: No focal deficit present. Mental Status: He is alert and oriented to person, place, and time. Psychiatric:         Mood and Affect: Mood normal.         Behavior: Behavior normal.         Thought Content: Thought content normal.         Judgment: Judgment normal.         ASSESSMENT/PLAN:       ICD-10-CM    1. Chronic pain syndrome  G89.4    2. Need for influenza vaccination  Z23 INFLUENZA, QUADV, 6 MO AND OLDER, IM, PF, PREFILL SYR, 0.5ML (FLUARIX QUADV, PF)   3.  Essential hypertension  I10 amLODIPine-valsartan-HCTZ (EXFORGE HCT) -12.5 MG TABS     CBC Auto Differential Comprehensive Metabolic Panel   4. Lipid disorder  E78.9 CBC Auto Differential     Comprehensive Metabolic Panel     Lipid Panel   5. Prostate cancer screening  Z12.5 Psa screening   6. Encounter for HCV screening test for low risk patient  Z11.59 Hepatitis C Antibody   7. High glucose  R73.09 Hemoglobin A1C   8.  Vitamin D deficiency  E55.9 Vitamin D 25 Hydroxy     Flu shot Today  OARRS check & review last Rx 11-  Schedule AWV  3 month

## 2020-12-15 LAB
ESTIMATED AVERAGE GLUCOSE: 142.7 MG/DL
HBA1C MFR BLD: 6.6 %

## 2020-12-28 RX ORDER — HYDROCODONE BITARTRATE AND ACETAMINOPHEN 5; 325 MG/1; MG/1
1 TABLET ORAL 2 TIMES DAILY
Qty: 60 TABLET | Refills: 0 | Status: SHIPPED | OUTPATIENT
Start: 2020-12-28 | End: 2021-02-03 | Stop reason: SDUPTHER

## 2020-12-28 NOTE — TELEPHONE ENCOUNTER
Medication:   Requested Prescriptions     Pending Prescriptions Disp Refills    HYDROcodone-acetaminophen (NORCO) 5-325 MG per tablet 60 tablet 0     Sig: Take 1 tablet by mouth 2 times daily for 30 days. Last Filled:      Patient Phone Number: [phone] (home)     Last appt: 12/14/2020   Next appt: 3/15/2021    Last OARRS:   RX Monitoring 9/14/2020   Attestation -   Periodic Controlled Substance Monitoring No signs of potential drug abuse or diversion identified.        Preferred Pharmacy:   [address] [facility] [phone]  [address] [place] [address]  Phone: [phone] Fax: [phone]

## 2021-02-03 DIAGNOSIS — M45.7 ANKYLOSING SPONDYLITIS OF LUMBOSACRAL REGION (HCC): ICD-10-CM

## 2021-02-03 DIAGNOSIS — M51.37 DDD (DEGENERATIVE DISC DISEASE), LUMBOSACRAL: ICD-10-CM

## 2021-02-03 DIAGNOSIS — G89.29 CHRONIC BILATERAL LOW BACK PAIN WITHOUT SCIATICA: ICD-10-CM

## 2021-02-03 DIAGNOSIS — M54.50 CHRONIC BILATERAL LOW BACK PAIN WITHOUT SCIATICA: ICD-10-CM

## 2021-02-03 RX ORDER — HYDROCODONE BITARTRATE AND ACETAMINOPHEN 5; 325 MG/1; MG/1
1 TABLET ORAL 2 TIMES DAILY
Qty: 60 TABLET | Refills: 0 | Status: SHIPPED | OUTPATIENT
Start: 2021-02-03 | End: 2021-03-08 | Stop reason: SDUPTHER

## 2021-02-03 NOTE — TELEPHONE ENCOUNTER
Medication:   Requested Prescriptions     Pending Prescriptions Disp Refills    HYDROcodone-acetaminophen (NORCO) 5-325 MG per tablet 60 tablet 0     Sig: Take 1 tablet by mouth 2 times daily for 30 days. Last Filled:      Patient Phone Number: [phone] (home)     Last appt: 12/14/2020   Next appt: 3/15/2021    Last OARRS:   RX Monitoring 9/14/2020   Attestation -   Periodic Controlled Substance Monitoring No signs of potential drug abuse or diversion identified.        Preferred Pharmacy:   [facility] [address], [facility] [phone]  [address] [place] [address]  Phone: [phone] Fax: [phone]

## 2021-03-08 ASSESSMENT — LIFESTYLE VARIABLES
HOW OFTEN DO YOU HAVE SIX OR MORE DRINKS ON ONE OCCASION: 2
HOW OFTEN DURING THE LAST YEAR HAVE YOU FOUND THAT YOU WERE NOT ABLE TO STOP DRINKING ONCE YOU HAD STARTED: 0
AUDIT-C TOTAL SCORE: 5
AUDIT TOTAL SCORE: 0
HAVE YOU OR SOMEONE ELSE BEEN INJURED AS A RESULT OF YOUR DRINKING: NO
HOW OFTEN DURING THE LAST YEAR HAVE YOU NEEDED AN ALCOHOLIC DRINK FIRST THING IN THE MORNING TO GET YOURSELF GOING AFTER A NIGHT OF HEAVY DRINKING: NEVER
HAS A RELATIVE, FRIEND, DOCTOR, OR ANOTHER HEALTH PROFESSIONAL EXPRESSED CONCERN ABOUT YOUR DRINKING OR SUGGESTED YOU CUT DOWN: 0
HOW OFTEN DO YOU HAVE SIX OR MORE DRINKS ON ONE OCCASION: MONTHLY
HOW OFTEN DURING THE LAST YEAR HAVE YOU BEEN UNABLE TO REMEMBER WHAT HAPPENED THE NIGHT BEFORE BECAUSE YOU HAD BEEN DRINKING: NEVER
HOW MANY STANDARD DRINKS CONTAINING ALCOHOL DO YOU HAVE ON A TYPICAL DAY: ONE OR TWO
HOW OFTEN DURING THE LAST YEAR HAVE YOU HAD A FEELING OF GUILT OR REMORSE AFTER DRINKING: NEVER
HOW OFTEN DURING THE LAST YEAR HAVE YOU FAILED TO DO WHAT WAS NORMALLY EXPECTED FROM YOU BECAUSE OF DRINKING: NEVER
HOW OFTEN DURING THE LAST YEAR HAVE YOU NEEDED AN ALCOHOLIC DRINK FIRST THING IN THE MORNING TO GET YOURSELF GOING AFTER A NIGHT OF HEAVY DRINKING: 0
HOW OFTEN DURING THE LAST YEAR HAVE YOU FOUND THAT YOU WERE NOT ABLE TO STOP DRINKING ONCE YOU HAD STARTED: NEVER
AUDIT TOTAL SCORE: 5
HOW OFTEN DURING THE LAST YEAR HAVE YOU FAILED TO DO WHAT WAS NORMALLY EXPECTED FROM YOU BECAUSE OF DRINKING: 0
HOW OFTEN DO YOU HAVE A DRINK CONTAINING ALCOHOL: TWO TO THREE TIMES A WEEK
AUDIT-C TOTAL SCORE: 0
HAS A RELATIVE, FRIEND, DOCTOR, OR ANOTHER HEALTH PROFESSIONAL EXPRESSED CONCERN ABOUT YOUR DRINKING OR SUGGESTED YOU CUT DOWN: NO

## 2021-03-08 ASSESSMENT — PATIENT HEALTH QUESTIONNAIRE - PHQ9
SUM OF ALL RESPONSES TO PHQ QUESTIONS 1-9: 0
SUM OF ALL RESPONSES TO PHQ QUESTIONS 1-9: 0
2. FEELING DOWN, DEPRESSED OR HOPELESS: 0
1. LITTLE INTEREST OR PLEASURE IN DOING THINGS: 0

## 2021-03-15 ENCOUNTER — OFFICE VISIT (OUTPATIENT)
Dept: PRIMARY CARE CLINIC | Age: 61
End: 2021-03-15
Payer: MEDICARE

## 2021-03-15 VITALS
BODY MASS INDEX: 32.96 KG/M2 | WEIGHT: 210 LBS | TEMPERATURE: 97.5 F | HEIGHT: 67 IN | SYSTOLIC BLOOD PRESSURE: 128 MMHG | HEART RATE: 102 BPM | DIASTOLIC BLOOD PRESSURE: 62 MMHG | OXYGEN SATURATION: 87 %

## 2021-03-15 DIAGNOSIS — L98.9 SKIN DISORDER: ICD-10-CM

## 2021-03-15 DIAGNOSIS — G47.33 OBSTRUCTIVE SLEEP APNEA SYNDROME: ICD-10-CM

## 2021-03-15 DIAGNOSIS — Z72.0 TOBACCO USE: ICD-10-CM

## 2021-03-15 DIAGNOSIS — R73.09 HIGH GLUCOSE: ICD-10-CM

## 2021-03-15 DIAGNOSIS — Z00.00 ENCOUNTER FOR SUBSEQUENT ANNUAL WELLNESS VISIT IN MEDICARE PATIENT: Primary | ICD-10-CM

## 2021-03-15 DIAGNOSIS — M45.7 ANKYLOSING SPONDYLITIS OF LUMBOSACRAL REGION (HCC): ICD-10-CM

## 2021-03-15 PROCEDURE — 3017F COLORECTAL CA SCREEN DOC REV: CPT | Performed by: FAMILY MEDICINE

## 2021-03-15 PROCEDURE — G0439 PPPS, SUBSEQ VISIT: HCPCS | Performed by: FAMILY MEDICINE

## 2021-03-15 PROCEDURE — G8482 FLU IMMUNIZE ORDER/ADMIN: HCPCS | Performed by: FAMILY MEDICINE

## 2021-03-15 ASSESSMENT — ENCOUNTER SYMPTOMS
SORE THROAT: 0
ALLERGIC/IMMUNOLOGIC NEGATIVE: 1
BACK PAIN: 1
ABDOMINAL PAIN: 0
SINUS PRESSURE: 0
ROS SKIN COMMENTS: SKIN TAGS
TROUBLE SWALLOWING: 0
SINUS PAIN: 0
SHORTNESS OF BREATH: 0
CHEST TIGHTNESS: 0
WHEEZING: 0

## 2021-03-15 NOTE — PROGRESS NOTES
SUBJECTIVE:  Patient ID: Jennifer Bryant is a 64 y.o. male. Chief Complaint:  Chief Complaint   Patient presents with    Medicare AWV       HPI   64year old Male  Annual Wellness  Dx Sleep Apnea +CPAP x 10 years No recent check   Tobacco use Decline Rx or OTC aid to quit tobacco   Last CT chest 2019  Chronic pain Neck & back Dx Ankylosing Spondylitis get Rx Norco  Hypertension Compliant with Rx No side effect  Lipid disorder compliant with Rx No side effect    Past Medical History:   Diagnosis Date    CVA (cerebral infarction)     hosp x 2 days [address]    Hypertension     Sleep apnea      No past surgical history on file. No Known Allergies    Family History   Problem Relation Age of Onset    High Blood Pressure Mother          age 68    High Blood Pressure Father          age 47    High Cholesterol Father    Garcia Cancer Sister         Brain age 79    Sleep Apnea Sister    Garcia Cancer Brother          age 79    Sleep Apnea Brother     Heart Attack Brother         age 52    [de-identified] Brother         age 8o >neck recovered     Social History     Social History Narrative    3 G children    Youngest son 25 y o lives with him    Wife +FT job she will retire        Patient Active Problem List   Diagnosis    History of CVA (cerebrovascular accident)    Sleep apnea    [facility]ylosing spondylitis ([facility] [address]    Hypertension    Tobacco abuse    DDD (degenerative disc disease), cervical    DDD (degenerative disc disease), lumbosacral    Sacroiliac joint dysfunction    Chronic back pain    Family history of coronary artery disease    Vitamin D deficiency    Diverticulosis of intestine without bleeding    Coronary artery calcification    Emphysema/COPD (HCC)    Adenomatous polyp    Lipid disorder     Current Outpatient Medications   Medication Sig Dispense Refill    HYDROcodone-acetaminophen (NORCO) 5-325 MG per tablet Take 1 tablet by mouth 2 times daily for 30 days.  60 tablet 0    amLODIPine-valsartan-HCTZ (EXFORGE HCT) -12.5 MG TABS One tablet qd 90 tablet 1    atorvastatin (LIPITOR) 20 MG tablet Take 1 tablet by mouth daily 90 tablet 1    Blood Pressure KIT Hypertension I10 1 kit 0    vitamin D (ERGOCALCIFEROL) 96258 UNITS CAPS capsule Take 1 capsule by mouth once a week 12 capsule 1    aspirin 325 MG tablet Take 325 mg by mouth daily. No current facility-administered medications for this visit.       Lab Results   Component Value Date    WBC 8.0 12/14/2020    HGB 14.6 12/14/2020    HCT 42.5 12/14/2020    MCV 82.9 12/14/2020     12/14/2020     Lab Results   Component Value Date    CHOL 160 12/14/2020    CHOL 130 12/04/2019    CHOL 189 10/17/2018     Lab Results   Component Value Date    TRIG 92 12/14/2020    TRIG 102 12/04/2019    TRIG 118 10/17/2018     Lab Results   Component Value Date    HDL 35 (L) 12/14/2020    HDL 42 12/04/2019    HDL 45 10/17/2018     Lab Results   Component Value Date    LDLCALC 107 (H) 12/14/2020    LDLCALC 68 12/04/2019    LDLCALC 120 (H) 10/17/2018     Lab Results   Component Value Date    LABVLDL 18 12/14/2020    LABVLDL 20 12/04/2019    LABVLDL 24 10/17/2018     Lab Results   Component Value Date    CHOLHDLRATIO 4.0 10/24/2016       Chemistry        Component Value Date/Time     12/14/2020 0942    K 4.5 12/14/2020 0942     12/14/2020 0942    CO2 27 12/14/2020 0942    BUN 14 12/14/2020 0942    CREATININE 0.9 12/14/2020 0942        Component Value Date/Time    CALCIUM 10.0 12/14/2020 0942    ALKPHOS 123 12/14/2020 0942    AST 18 12/14/2020 0942    ALT 16 12/14/2020 0942    BILITOT 0.4 12/14/2020 0942        Lab Results   Component Value Date    LABA1C 6.6 12/14/2020     Lab Results   Component Value Date    .7 12/14/2020     Lab Results   Component Value Date    PSA 2.47 12/14/2020    PSA 2.00 12/04/2019    PSA 1.77 10/17/2018     Lab Results   Component Value Date    TSH 1.11 10/23/2017         Review of Systems Constitutional: Negative for chills and diaphoresis. Activity  Walk depends on weather @least 3 x/week about 10 minutes each time   HENT: Positive for hearing loss. Negative for sinus pressure, sinus pain, sore throat and trouble swallowing. Jackelin Menjivar to hear Lt>RT  ENT evaluation @[facility] 1-   Eyes:        Due for Cataract RT eye   Respiratory: Negative for chest tightness, shortness of breath and wheezing.         +Tobacco  No inhaler  +CPAP   Cardiovascular: Negative for chest pain, palpitations and leg swelling. Lipid disorder  Hypertension   Gastrointestinal: Negative for abdominal pain. BM regular  Colonoscopy 2/15/2018  Dr Alvin Arroyo   Endocrine: Negative. No Hx Diabetes   Genitourinary: Negative for dysuria, frequency and urgency. Musculoskeletal: Positive for back pain and neck pain. Negative for gait problem. No cane  No walker  Chronic Pain Dx Ankylosing Spondylitis  Hx Lt Knee disorder evaluation by Dr Lisa Reyes in past  Lumbar MRI  6-2014  Cervical spine MRI 6- 2014   Skin:        Skin tags   Allergic/Immunologic: Negative. Neurological: Negative for dizziness, light-headedness and headaches. Hx CVA   Hematological: Negative. Psychiatric/Behavioral: Negative for behavioral problems, self-injury and sleep disturbance. The patient is not nervous/anxious. OBJECTIVE:  /62 (Site: Right Upper Arm, Position: Sitting, Cuff Size: Large Adult)   Pulse 102   Temp 97.5 °F (36.4 °C) (Infrared)   Ht 5' 7\" (1.702 m)   Wt 210 lb (95.3 kg)   SpO2 (!) 87%   BMI 32.89 kg/m²   Physical Exam  Constitutional:       Appearance: He is obese. HENT:      Head: Normocephalic. Eyes:      Extraocular Movements: Extraocular movements intact. Pupils: Pupils are equal, round, and reactive to light. Neck:      Musculoskeletal: Normal range of motion and neck supple. Cardiovascular:      Rate and Rhythm: Normal rate and regular rhythm. Heart sounds: Normal heart sounds. Pulmonary:      Effort: Pulmonary effort is normal.      Breath sounds: Normal breath sounds. No wheezing or rhonchi. Musculoskeletal:         General: Deformity present. Right lower leg: No edema. Left lower leg: No edema. Comments: Walk slow    Skin:     Findings: No rash. Comments: Skin tag  Skin moles   Neurological:      General: No focal deficit present. Mental Status: He is alert and oriented to person, place, and time. Psychiatric:         Mood and Affect: Mood normal.         Behavior: Behavior normal.         Thought Content: Thought content normal.         Judgment: Judgment normal.         ASSESSMENT/PLAN:       ICD-10-CM    1. Encounter for subsequent annual wellness visit in Medicare patient  Z00.00    2. Ankylosing spondylitis of lumbosacral region [facility])  M45.7 Prince Vargas MD, Rheumatology, [facility]   3. Skin disorder  L98.9 [facility] Dermatology   4. Obstructive sleep apnea syndrome  G47.33 Tasha Sánchez MD, Sleep Medicine, [facility]     CT CHEST WO CONTRAST   5. Tobacco use  Z72.0 CT CHEST WO CONTRAST   6.  High glucose  R73.09 Hemoglobin A1C     Discuss Tobacco cessation  Declined any Rx for Tobacco cessation  Discuss Covid Vaccine He prefers to wait  OARRS check   Rx Norco given 3-8-2021  Referral as above  Lab as above  Not sure about covid vaccine

## 2021-04-15 ENCOUNTER — PATIENT MESSAGE (OUTPATIENT)
Dept: PRIMARY CARE CLINIC | Age: 61
End: 2021-04-15

## 2021-04-15 DIAGNOSIS — M45.7 ANKYLOSING SPONDYLITIS OF LUMBOSACRAL REGION (HCC): ICD-10-CM

## 2021-04-15 DIAGNOSIS — M51.37 DDD (DEGENERATIVE DISC DISEASE), LUMBOSACRAL: ICD-10-CM

## 2021-04-15 DIAGNOSIS — M54.50 CHRONIC BILATERAL LOW BACK PAIN WITHOUT SCIATICA: ICD-10-CM

## 2021-04-15 DIAGNOSIS — G89.29 CHRONIC BILATERAL LOW BACK PAIN WITHOUT SCIATICA: ICD-10-CM

## 2021-04-15 RX ORDER — HYDROCODONE BITARTRATE AND ACETAMINOPHEN 5; 325 MG/1; MG/1
1 TABLET ORAL 2 TIMES DAILY
Qty: 60 TABLET | Refills: 0 | Status: SHIPPED | OUTPATIENT
Start: 2021-04-15 | End: 2021-06-14 | Stop reason: SDUPTHER

## 2021-04-15 NOTE — TELEPHONE ENCOUNTER
Medication:   Requested Prescriptions     Pending Prescriptions Disp Refills    HYDROcodone-acetaminophen (NORCO) 5-325 MG per tablet 60 tablet 0     Sig: Take 1 tablet by mouth 2 times daily for 30 days. Last Filled:  3.8.21  Last appt: 3/15/2021   Next appt: 6/14/2021    Last OARRS:   RX Monitoring 9/14/2020   Attestation -   Periodic Controlled Substance Monitoring No signs of potential drug abuse or diversion identified.

## 2021-06-14 ENCOUNTER — OFFICE VISIT (OUTPATIENT)
Dept: PRIMARY CARE CLINIC | Age: 61
End: 2021-06-14
Payer: MEDICARE

## 2021-06-14 VITALS
WEIGHT: 207.2 LBS | SYSTOLIC BLOOD PRESSURE: 130 MMHG | HEART RATE: 105 BPM | DIASTOLIC BLOOD PRESSURE: 80 MMHG | OXYGEN SATURATION: 97 % | BODY MASS INDEX: 32.45 KG/M2

## 2021-06-14 DIAGNOSIS — G89.29 CHRONIC BILATERAL LOW BACK PAIN WITHOUT SCIATICA: ICD-10-CM

## 2021-06-14 DIAGNOSIS — M54.50 CHRONIC BILATERAL LOW BACK PAIN WITHOUT SCIATICA: ICD-10-CM

## 2021-06-14 DIAGNOSIS — E78.00 HYPERCHOLESTEROLEMIA: ICD-10-CM

## 2021-06-14 DIAGNOSIS — M51.37 DDD (DEGENERATIVE DISC DISEASE), LUMBOSACRAL: ICD-10-CM

## 2021-06-14 DIAGNOSIS — I10 ESSENTIAL HYPERTENSION: Primary | ICD-10-CM

## 2021-06-14 DIAGNOSIS — Z72.0 TOBACCO USE: ICD-10-CM

## 2021-06-14 DIAGNOSIS — M45.7 ANKYLOSING SPONDYLITIS OF LUMBOSACRAL REGION (HCC): ICD-10-CM

## 2021-06-14 PROCEDURE — G8427 DOCREV CUR MEDS BY ELIG CLIN: HCPCS | Performed by: FAMILY MEDICINE

## 2021-06-14 PROCEDURE — 4004F PT TOBACCO SCREEN RCVD TLK: CPT | Performed by: FAMILY MEDICINE

## 2021-06-14 PROCEDURE — G8417 CALC BMI ABV UP PARAM F/U: HCPCS | Performed by: FAMILY MEDICINE

## 2021-06-14 PROCEDURE — 99214 OFFICE O/P EST MOD 30 MIN: CPT | Performed by: FAMILY MEDICINE

## 2021-06-14 PROCEDURE — 3017F COLORECTAL CA SCREEN DOC REV: CPT | Performed by: FAMILY MEDICINE

## 2021-06-14 RX ORDER — HYDROCODONE BITARTRATE AND ACETAMINOPHEN 5; 325 MG/1; MG/1
1 TABLET ORAL 2 TIMES DAILY
Qty: 60 TABLET | Refills: 0 | Status: SHIPPED | OUTPATIENT
Start: 2021-06-14 | End: 2021-08-02 | Stop reason: SDUPTHER

## 2021-06-14 RX ORDER — ATORVASTATIN CALCIUM 20 MG/1
20 TABLET, FILM COATED ORAL DAILY
Qty: 90 TABLET | Refills: 1 | Status: SHIPPED | OUTPATIENT
Start: 2021-06-14 | End: 2021-11-12 | Stop reason: SDUPTHER

## 2021-06-14 RX ORDER — AMLODIPINE, VALSARTAN AND HYDROCHLOROTHIAZIDE 10; 160; 12.5 MG/1; MG/1; MG/1
TABLET ORAL
Qty: 90 TABLET | Refills: 1 | Status: SHIPPED | OUTPATIENT
Start: 2021-06-14 | End: 2022-06-27

## 2021-06-14 ASSESSMENT — ENCOUNTER SYMPTOMS
WHEEZING: 0
ABDOMINAL PAIN: 0
CHEST TIGHTNESS: 0
BACK PAIN: 1
EYES NEGATIVE: 1

## 2021-06-14 NOTE — PROGRESS NOTES
1    atorvastatin (LIPITOR) 20 MG tablet Take 1 tablet by mouth daily 90 tablet 1    vitamin D (ERGOCALCIFEROL) 72622 UNITS CAPS capsule Take 1 capsule by mouth once a week 12 capsule 1    aspirin 325 MG tablet Take 325 mg by mouth daily.  Blood Pressure KIT Hypertension I10 (Patient not taking: Reported on 6/14/2021) 1 kit 0     No current facility-administered medications for this visit.      Lab Results   Component Value Date    WBC 8.0 12/14/2020    HGB 14.6 12/14/2020    HCT 42.5 12/14/2020    MCV 82.9 12/14/2020     12/14/2020     Lab Results   Component Value Date    CHOL 160 12/14/2020    CHOL 130 12/04/2019    CHOL 189 10/17/2018     Lab Results   Component Value Date    TRIG 92 12/14/2020    TRIG 102 12/04/2019    TRIG 118 10/17/2018     Lab Results   Component Value Date    HDL 35 (L) 12/14/2020    HDL 42 12/04/2019    HDL 45 10/17/2018     Lab Results   Component Value Date    LDLCALC 107 (H) 12/14/2020    LDLCALC 68 12/04/2019    LDLCALC 120 (H) 10/17/2018     Lab Results   Component Value Date    LABVLDL 18 12/14/2020    LABVLDL 20 12/04/2019    LABVLDL 24 10/17/2018     Lab Results   Component Value Date    CHOLHDLRATIO 4.0 10/24/2016       Chemistry        Component Value Date/Time     12/14/2020 0942    K 4.5 12/14/2020 0942     12/14/2020 0942    CO2 27 12/14/2020 0942    BUN 14 12/14/2020 0942    CREATININE 0.9 12/14/2020 0942        Component Value Date/Time    CALCIUM 10.0 12/14/2020 0942    ALKPHOS 123 12/14/2020 0942    AST 18 12/14/2020 0942    ALT 16 12/14/2020 0942    BILITOT 0.4 12/14/2020 0942        Lab Results   Component Value Date    LABA1C 6.6 12/14/2020     Lab Results   Component Value Date    .7 12/14/2020     Lab Results   Component Value Date    PSA 2.47 12/14/2020    PSA 2.00 12/04/2019    PSA 1.77 10/17/2018     Lab Results   Component Value Date    TSH 1.11 10/23/2017         Review of Systems   Constitutional:        Walk 2-3 x /day  Laundry + up the steps  Cut the grass   HENT: Negative. ?hearing issue   Eyes: Negative. Eye exam due   Respiratory: Negative for chest tightness and wheezing. Tobacco use  +CPAP   Cardiovascular: Negative for chest pain, palpitations and leg swelling. Gastrointestinal: Negative for abdominal pain. BM once every other  Colonoscopy it will be schedule   Genitourinary: Negative for dysuria, flank pain and urgency. Musculoskeletal: Positive for back pain and neck pain. Back pain  Pain level varies 3-7 it varies from day to day  No particular reason to induce pain   Skin: Negative for rash. Allergic/Immunologic: Negative for environmental allergies. Neurological: Negative for dizziness, light-headedness and headaches. Psychiatric/Behavioral: Negative for behavioral problems, self-injury and sleep disturbance. The patient is not nervous/anxious. Wife  Son  See brother once /month  Neighbor   Sleep is ok         OBJECTIVE:  /80 (Site: Right Upper Arm, Position: Sitting, Cuff Size: Large Adult)   Pulse 105   Wt 207 lb 3.2 oz (94 kg)   SpO2 97%   BMI 32.45 kg/m²   Physical Exam  HENT:      Head: Normocephalic. Eyes:      Extraocular Movements: Extraocular movements intact. Pupils: Pupils are equal, round, and reactive to light. Cardiovascular:      Rate and Rhythm: Normal rate and regular rhythm. Heart sounds: Normal heart sounds. Pulmonary:      Effort: Pulmonary effort is normal.      Breath sounds: Rhonchi present. No wheezing. Musculoskeletal:      Cervical back: Normal range of motion and neck supple. Right lower leg: No edema. Left lower leg: No edema. Comments: Good Balance  Walk straight   Skin:     General: Skin is dry. Neurological:      General: No focal deficit present. Mental Status: He is alert and oriented to person, place, and time.    Psychiatric:         Mood and Affect: Mood normal.         Behavior: Behavior

## 2021-06-14 NOTE — LETTER
CONTROLLED SUBSTANCE MEDICATION AGREEMENT     Patient Name: Harmeet Shukla  Patient YOB: 1960   I understand, that controlled substance medications may be used to help better manage my symptoms and to improve my ability to function at home, work and in social settings. However, I also understand that these medications do have risks, which have been discussed with me, including possible development of physical or psychological dependence. I understand that successful treatment requires mutual trust and honesty between me and my provider. I understand and agree that following this Medication Agreement is necessary in continuing my provider-patient relationship and the success of my treatment plan. Explanation from my Provider: Benefits and Goals of Controlled Substance Medications: There are two potential goals for your treatment: (1) decreased pain and suffering (2) improved daily life functions. There are many possible treatments for your chronic condition(s). Alternatives such as physical therapy, yoga, massage, home daily exercise, meditation, relaxation techniques, injections, chiropractic manipulations, surgery, cognitive therapy, hypnosis and many medications that are not habit-forming may be used. Use of controlled substance medications may be helpful, but they are unlikely to resolve all symptoms or restore all function. Explanation from my Provider: Risks of Controlled Substance Medications:  Opioid pain medications: These medications can lead to problems such as addiction/dependence, sedation, lightheadedness/dizziness, memory issues, falls, constipation, nausea, or vomiting. They may also impair the ability to drive or operate machinery. Additionally, these medications may lower testosterone levels, leading to loss of bone strength, stamina and sex drive.   They may cause problems with breathing, sleep apnea and reduced coughing, which is especially dangerous for patients with medications are combined with other stimulants, such as caffeine pills or energy drinks, certain weight loss supplements and oral decongestants. Dependence withdrawal symptoms may include depressed mood, loss of interest, suicidal thoughts, anxiety, fatigue, appetite changes and agitation. Testosterone replacement therapy:  Potential side effects include increased risk of stroke and heart attack, blood clots, increased blood pressure, increased cholesterol, enlarged prostate, sleep apnea, irritability/aggression and other mood disorders, and decreased fertility. I agree and understand that I and my prescriber have the following rights and responsibilities regarding my treatment plan:     1. MY RIGHTS:  To be informed of my treatment and medication plan. To be an active participant in my health and wellbeing. 2. MY RESPONSIBILITY AND UNDERSTANDING FOR USE OF MEDICATIONS   I will take medications at the dose and frequency as directed. For my safety, I will not increase or change how I take my medications without the recommendation of my healthcare provider.  I will actively participate in any program recommended by my provider which may improve function, including social, physical, psychological programs.  I will not take my medications with alcohol or other drugs not prescribed to me. I understand that drinking alcohol with my medications increases the chances of side effects, including reduced breathing rate and could lead to personal injury when operating machinery.  I understand that if I have a history of substance use disorders, including alcohol or other illicit drugs, that I may be at increased risk of addiction to my medications.  I agree to notify my provider immediately if I should become pregnant so that my treatment plan can be adjusted.    I agree and understand that I shall only receive controlled substance medications from the prescriber that signed this agreement unless there is written agreement among other prescribers of controlled substances outlining the responsibility of the medications being prescribed.  I understand that the if the controlled medication is not helping to achieve goals, the dosage may be tapered and no longer prescribed. 3. MY RESPONSIBILITY FOR COMMUNICATION / PRESCRIPTION RENEWALS   I agree that all controlled substance medications that I take will be prescribed only by my provider. If another healthcare provider prescribes me medication in an emergency, I will notify my provider within seventy-two (72) hours.  I will arrange for refills at the prescribed interval ONLY during regular office hours. I will not ask for refills earlier than agreed, after-hours, on holidays or weekends. Refills may take up to 72 hours for processing and prescriptions to reach the pharmacy.  I will inform my other health care providers that I am taking these medications and of the existence of this [id number]. In the event of an emergency, I will provide the same information to the emergency department prescribers.  I will keep my provider updated on the pharmacy I am using for controlled medication prescription filling. Initials:_______  4. MY RESPONSIBILITY FOR PROTECTING MEDICATIONS   I will protect my prescriptions and medications. I understand that lost or misplaced prescriptions will not be replaced.  I will keep medications only for my own use and will not share them with others. I will keep all medications away from children.  I agree that if my medications are adjusted or discontinued, I will properly dispose of any remaining medications. I understand that I will be required to dispose of any remaining controlled medications as, directed by my prescriber, prior to being provided with any prescriptions for other controlled medications.   Medication drop box locations can be found at: [employer]    5. MY RESPONSIBILITY WITH ILLEGAL DRUGS    I will not use illegal or street drugs or another person's prescription medications not prescribed to me.  If there are identified addiction type symptoms, then referral to a program may be provided by my provider and I agree to follow through with this recommendation. 6. MY RESPONSIBILITY FOR COOPERATION WITH INVESTIGATIONS   I understand that my provider will comply with any applicable law and may discuss my use and/or possible misuse/abuse of controlled substances and alcohol, as appropriate, with any health care provider involved in my care, pharmacist, or legal authority.  I authorize my provider and pharmacy to cooperate fully with law enforcement agencies (as permitted by law) in the investigation of any possible misuse, sale, or other diversion of my controlled substances.  I agree to waive any applicable privilege or right of privacy or confidentiality with respect to these authorizations. 7. PROVIDERS RIGHT TO MONITOR FOR SAFETY: PRESCRIPTION MONITORING / DRUG TESTING   I consent to drug/toxicology screening and will submit to a drug screen upon my providers request to assure I am only taking the prescribed drugs for my safety monitoring. I understand that a drug screen is a laboratory test in which a sample of my urine, blood or saliva is checked to see what drugs I have been taking. This may entail an observed urine specimen, which means that a nurse or other health care provider may watch me provide urine, and I will cooperate if I am asked to provide an observed specimen.  I understand that my provider will check a copy of my State Prescription Monitoring Program () Report in order to safely prescribe medications.  Pill Counts: I consent to pill counts when requested.   I may be asked to bring all my prescribed controlled substance medications, in their original bottles, to all of my scheduled appointments. In addition, my provider may ask me to come to the practice at any time for a random pill count. 8. TERMINATION OF THIS AGREEMENT  For my safety, my prescriber has the right to stop prescribing controlled substance medications and may end this agreement. Initials:_______   Conditions that may result in termination of this agreement:  a. I do not show any improvement in pain, or my activity has not improved. b. I develop rapid tolerance or loss of improvement, as described in my treatment plan.  c. I develop significant side effects from the medication. d. My behavior is not consistent with the responsibilities outlined above, thereby causing safety concerns to continue prescribing controlled substance medications. e. I fail to follow the terms of this agreement. f. Other:____________________________       UNDERSTANDING THIS MEDICATION AGREEMENT:    I have read the above and have had all my questions answered. For chronic disease management, I know that my symptoms can be managed with many types of treatments. A chronic medication trial may be part of my treatment, but I must be an active participant in my care. Medication therapy is only one part of my symptom management plan. In some cases, there may be limited scientific evidence to support the chronic use of certain medications to improve symptoms and daily function. Furthermore, in certain circumstances, there may be scientific information that suggests that the use of chronic controlled substances may worsen my symptoms and increase my risk of unintentional death directly related to this medication therapy. I know that if my provider feels my risk from controlled medications is greater than my benefit, I will have my controlled substance medication(s) compassionately lowered or removed altogether.      I further agree to allow this office to contact my HIPAA contact if there are concerns about my safety and use of the controlled medications. I have agreed to use the prescribed controlled substance medications to me as instructed by my provider and as stated in this Medication Agreement. My initial on each page and my signature below shows that I have read each page and I have had the opportunity to ask questions with answers provided by my provider.     Patient Name (Printed): _____________________________________  Patient Signature:  ______________________   Date: _____________    Prescriber Name (Printed): ___________________________________  Prescriber Signature: _____________________  Date: _____________

## 2021-06-18 ENCOUNTER — TELEPHONE (OUTPATIENT)
Dept: PRIMARY CARE CLINIC | Age: 61
End: 2021-06-18

## 2021-06-18 RX ORDER — LOSARTAN POTASSIUM 50 MG/1
50 TABLET ORAL DAILY
Qty: 90 TABLET | Refills: 1 | Status: SHIPPED | OUTPATIENT
Start: 2021-06-18 | End: 2021-11-12 | Stop reason: SDUPTHER

## 2021-06-18 RX ORDER — AMLODIPINE BESYLATE 10 MG/1
10 TABLET ORAL DAILY
Qty: 90 TABLET | Refills: 1 | Status: SHIPPED | OUTPATIENT
Start: 2021-06-18 | End: 2021-11-12 | Stop reason: SDUPTHER

## 2021-06-21 ENCOUNTER — PATIENT MESSAGE (OUTPATIENT)
Dept: PRIMARY CARE CLINIC | Age: 61
End: 2021-06-21

## 2021-08-02 ENCOUNTER — PATIENT MESSAGE (OUTPATIENT)
Dept: PRIMARY CARE CLINIC | Age: 61
End: 2021-08-02

## 2021-08-02 DIAGNOSIS — G89.29 CHRONIC BILATERAL LOW BACK PAIN WITHOUT SCIATICA: ICD-10-CM

## 2021-08-02 DIAGNOSIS — M45.7 ANKYLOSING SPONDYLITIS OF LUMBOSACRAL REGION (HCC): ICD-10-CM

## 2021-08-02 DIAGNOSIS — M51.37 DDD (DEGENERATIVE DISC DISEASE), LUMBOSACRAL: ICD-10-CM

## 2021-08-02 DIAGNOSIS — M54.50 CHRONIC BILATERAL LOW BACK PAIN WITHOUT SCIATICA: ICD-10-CM

## 2021-08-02 RX ORDER — HYDROCODONE BITARTRATE AND ACETAMINOPHEN 5; 325 MG/1; MG/1
1 TABLET ORAL 2 TIMES DAILY
Qty: 60 TABLET | Refills: 0 | Status: SHIPPED | OUTPATIENT
Start: 2021-08-02 | End: 2021-09-01

## 2021-08-02 NOTE — TELEPHONE ENCOUNTER
From: Susanne Henao  To: Chelo Feng MD  Sent: 8/2/2021 6:44 AM EDT  Subject: Non-Urgent Medical Question    I need a refill on my pain Meds. please. Thank you.

## 2021-08-02 NOTE — TELEPHONE ENCOUNTER
Medication:   Requested Prescriptions     Pending Prescriptions Disp Refills    HYDROcodone-acetaminophen (NORCO) 5-325 MG per tablet 60 tablet 0     Sig: Take 1 tablet by mouth 2 times daily for 30 days. Last Filled:  6.14.21  Last appt: 6/14/2021   Next appt: 9/17/2021    Last OARRS:   RX Monitoring 6/14/2021   Attestation -   Periodic Controlled Substance Monitoring Obtaining appropriate analgesic effect of treatment.

## 2021-09-24 ENCOUNTER — OFFICE VISIT (OUTPATIENT)
Dept: PRIMARY CARE CLINIC | Age: 61
End: 2021-09-24
Payer: MEDICARE

## 2021-09-24 VITALS
DIASTOLIC BLOOD PRESSURE: 75 MMHG | SYSTOLIC BLOOD PRESSURE: 140 MMHG | HEART RATE: 100 BPM | WEIGHT: 206.2 LBS | OXYGEN SATURATION: 99 % | HEIGHT: 67 IN | TEMPERATURE: 97.9 F | BODY MASS INDEX: 32.36 KG/M2

## 2021-09-24 DIAGNOSIS — G47.33 OBSTRUCTIVE SLEEP APNEA SYNDROME: ICD-10-CM

## 2021-09-24 DIAGNOSIS — Z72.0 TOBACCO ABUSE: ICD-10-CM

## 2021-09-24 DIAGNOSIS — M54.50 CHRONIC BILATERAL LOW BACK PAIN WITHOUT SCIATICA: ICD-10-CM

## 2021-09-24 DIAGNOSIS — Z23 NEEDS FLU SHOT: ICD-10-CM

## 2021-09-24 DIAGNOSIS — M45.7 ANKYLOSING SPONDYLITIS OF LUMBOSACRAL REGION (HCC): ICD-10-CM

## 2021-09-24 DIAGNOSIS — Z12.11 COLON CANCER SCREENING: ICD-10-CM

## 2021-09-24 DIAGNOSIS — M51.37 DDD (DEGENERATIVE DISC DISEASE), LUMBOSACRAL: Primary | ICD-10-CM

## 2021-09-24 DIAGNOSIS — G89.29 CHRONIC BILATERAL LOW BACK PAIN WITHOUT SCIATICA: ICD-10-CM

## 2021-09-24 PROCEDURE — 3017F COLORECTAL CA SCREEN DOC REV: CPT | Performed by: FAMILY MEDICINE

## 2021-09-24 PROCEDURE — G8427 DOCREV CUR MEDS BY ELIG CLIN: HCPCS | Performed by: FAMILY MEDICINE

## 2021-09-24 PROCEDURE — G0008 ADMIN INFLUENZA VIRUS VAC: HCPCS | Performed by: FAMILY MEDICINE

## 2021-09-24 PROCEDURE — 99214 OFFICE O/P EST MOD 30 MIN: CPT | Performed by: FAMILY MEDICINE

## 2021-09-24 PROCEDURE — 4004F PT TOBACCO SCREEN RCVD TLK: CPT | Performed by: FAMILY MEDICINE

## 2021-09-24 PROCEDURE — 90674 CCIIV4 VAC NO PRSV 0.5 ML IM: CPT | Performed by: FAMILY MEDICINE

## 2021-09-24 PROCEDURE — G8417 CALC BMI ABV UP PARAM F/U: HCPCS | Performed by: FAMILY MEDICINE

## 2021-09-24 RX ORDER — HYDROCODONE BITARTRATE AND ACETAMINOPHEN 5; 325 MG/1; MG/1
1 TABLET ORAL 2 TIMES DAILY
Qty: 60 TABLET | Refills: 0 | Status: SHIPPED | OUTPATIENT
Start: 2021-09-24 | End: 2021-10-24

## 2021-09-24 SDOH — HEALTH STABILITY: PHYSICAL HEALTH: ON AVERAGE, HOW MANY MINUTES DO YOU ENGAGE IN EXERCISE AT THIS LEVEL?: 10 MIN

## 2021-09-24 SDOH — ECONOMIC STABILITY: HOUSING INSECURITY
IN THE LAST 12 MONTHS, WAS THERE A TIME WHEN YOU DID NOT HAVE A STEADY PLACE TO SLEEP OR SLEPT IN A SHELTER (INCLUDING NOW)?: NO

## 2021-09-24 SDOH — ECONOMIC STABILITY: INCOME INSECURITY: IN THE LAST 12 MONTHS, WAS THERE A TIME WHEN YOU WERE NOT ABLE TO PAY THE MORTGAGE OR RENT ON TIME?: NO

## 2021-09-24 SDOH — ECONOMIC STABILITY: HOUSING INSECURITY: IN THE LAST 12 MONTHS, HOW MANY PLACES HAVE YOU LIVED?: 1

## 2021-09-24 SDOH — HEALTH STABILITY: PHYSICAL HEALTH: ON AVERAGE, HOW MANY DAYS PER WEEK DO YOU ENGAGE IN MODERATE TO STRENUOUS EXERCISE (LIKE A BRISK WALK)?: 7 DAYS

## 2021-09-24 ASSESSMENT — SOCIAL DETERMINANTS OF HEALTH (SDOH)
HOW OFTEN DO YOU ATTENT MEETINGS OF THE CLUB OR ORGANIZATION YOU BELONG TO?: NEVER
HOW OFTEN DO YOU GET TOGETHER WITH FRIENDS OR RELATIVES?: ONCE A WEEK
HOW OFTEN DO YOU ATTEND CHURCH OR RELIGIOUS SERVICES?: NEVER
IN A TYPICAL WEEK, HOW MANY TIMES DO YOU TALK ON THE PHONE WITH FAMILY, FRIENDS, OR NEIGHBORS?: THREE TIMES A WEEK
DO YOU BELONG TO ANY CLUBS OR ORGANIZATIONS SUCH AS CHURCH GROUPS UNIONS, FRATERNAL OR ATHLETIC GROUPS, OR SCHOOL GROUPS?: NO

## 2021-09-24 ASSESSMENT — ENCOUNTER SYMPTOMS
CHEST TIGHTNESS: 0
ALLERGIC/IMMUNOLOGIC NEGATIVE: 1
BACK PAIN: 1
WHEEZING: 0
EYES NEGATIVE: 1

## 2021-09-24 ASSESSMENT — LIFESTYLE VARIABLES
HOW MANY STANDARD DRINKS CONTAINING ALCOHOL DO YOU HAVE ON A TYPICAL DAY: 3 OR 4
HOW OFTEN DO YOU HAVE A DRINK CONTAINING ALCOHOL: 2-3 TIMES A WEEK

## 2021-09-24 NOTE — PROGRESS NOTES
SUBJECTIVE:  Patient ID: Tien Field is a 64 y.o. male. Chief Complaint:  Chief Complaint   Patient presents with    Hypertension    Hyperlipidemia       HPI   64year old Male  Chronic Pain Back & Neck Dx DDD cervical & Lumbar Dx Ankylosing Spondylitis  Lipid disorder Compliant with Rx   Hypertension compliant with Rx    Past Medical History:   Diagnosis Date    CVA (cerebral infarction)     hosp x 2 days [address]    Hypertension     Sleep apnea      No past surgical history on file. No Known Allergies    Family History   Problem Relation Age of Onset    High Blood Pressure Mother          age 68    High Blood Pressure Father          age 47    High Cholesterol Father     Cancer Sister         Brain age 79    Sleep Apnea Sister    [employer] Cancer Brother          age 79    Sleep Apnea Brother     Heart Attack Brother         age 52    Cancer Brother         age 8o >neck recovered     Social History     Social History Narrative    3 G children    Youngest son 25 y o lives with him    Wife +FT job she will retire -    +Tobacco use    Beer 6-8 /week       .   Patient Active Problem List   Diagnosis    History of CVA (cerebrovascular accident)    Obstructive sleep apnea syndrome    Ankylosing spondylitis ([address]    Hypertension    Tobacco abuse    DDD (degenerative disc disease), cervical    DDD (degenerative disc disease), lumbosacral    Sacroiliac joint dysfunction    Chronic back pain    Family history of coronary artery disease    Vitamin D deficiency    Diverticulosis of intestine without bleeding    Coronary artery calcification    Emphysema/COPD (HCC)    Adenomatous polyp    Lipid disorder     Current Outpatient Medications   Medication Sig Dispense Refill    losartan (COZAAR) 50 MG tablet Take 1 tablet by mouth daily 90 tablet 1    atorvastatin (LIPITOR) 20 MG tablet Take 1 tablet by mouth daily 90 tablet 1    amLODIPine-valsartan-HCTZ (EXFORGE HCT) -12.5 MG TABS One tablet qd 90 tablet 1    vitamin D (ERGOCALCIFEROL) 81613 UNITS CAPS capsule Take 1 capsule by mouth once a week 12 capsule 1    aspirin 325 MG tablet Take 325 mg by mouth daily.  amLODIPine (NORVASC) 10 MG tablet Take 1 tablet by mouth daily (Patient not taking: Reported on 9/24/2021) 90 tablet 1     No current facility-administered medications for this visit. Lab Results   Component Value Date    WBC 8.0 12/14/2020    HGB 14.6 12/14/2020    HCT 42.5 12/14/2020    MCV 82.9 12/14/2020     12/14/2020     Lab Results   Component Value Date    CHOL 160 12/14/2020    CHOL 130 12/04/2019    CHOL 189 10/17/2018     Lab Results   Component Value Date    TRIG 92 12/14/2020    TRIG 102 12/04/2019    TRIG 118 10/17/2018     Lab Results   Component Value Date    HDL 35 (L) 12/14/2020    HDL 42 12/04/2019    HDL 45 10/17/2018     Lab Results   Component Value Date    LDLCALC 107 (H) 12/14/2020    LDLCALC 68 12/04/2019    LDLCALC 120 (H) 10/17/2018     Lab Results   Component Value Date    LABVLDL 18 12/14/2020    LABVLDL 20 12/04/2019    LABVLDL 24 10/17/2018     Lab Results   Component Value Date    CHOLHDLRATIO 4.0 10/24/2016       Chemistry        Component Value Date/Time     12/14/2020 0942    K 4.5 12/14/2020 0942     12/14/2020 0942    CO2 27 12/14/2020 0942    BUN 14 12/14/2020 0942    CREATININE 0.9 12/14/2020 0942        Component Value Date/Time    CALCIUM 10.0 12/14/2020 0942    ALKPHOS 123 12/14/2020 0942    AST 18 12/14/2020 0942    ALT 16 12/14/2020 0942    BILITOT 0.4 12/14/2020 0942            Review of Systems   Constitutional:        Over all good  Walk daily   HENT: Negative. Eyes: Negative. Due for eye check [facility]   Respiratory: Negative for chest tightness and wheezing. CPAP  +tobacco    Cardiovascular: Negative for chest pain, palpitations and leg swelling.    Gastrointestinal:        BM fine   Genitourinary: Negative for dysuria, flank pain and frequency. Musculoskeletal: Positive for back pain and neck pain. Chronic pain Neck & back   Shoot Rt leg   Skin:        Due dermatology    Allergic/Immunologic: Negative. Neurological: Negative for dizziness and headaches. Hematological: Negative. Psychiatric/Behavioral: Negative for behavioral problems, decreased concentration, self-injury, sleep disturbance and suicidal ideas. Good support system   Wife + Heart disease   Son 32year old   Brother in law   Friend near by       OBJECTIVE:  BP (!) 144/72 (Site: Left Upper Arm, Position: Sitting, Cuff Size: Medium Adult)   Pulse 100   Temp 97.9 °F (36.6 °C) (Oral)   Ht 5' 7\" (1.702 m)   Wt 206 lb 3.2 oz (93.5 kg)   SpO2 99%   BMI 32.30 kg/m²   Physical Exam  Constitutional:       Comments: BMI 32   HENT:      Head: Normocephalic. Eyes:      Extraocular Movements: Extraocular movements intact. Cardiovascular:      Rate and Rhythm: Normal rate and regular rhythm. Heart sounds: Normal heart sounds. Pulmonary:      Effort: Pulmonary effort is normal.      Breath sounds: Normal breath sounds. No wheezing or rhonchi. Musculoskeletal:      Cervical back: Neck supple. Right lower leg: No edema. Left lower leg: No edema. Skin:     Findings: No rash. Neurological:      General: No focal deficit present. Mental Status: He is oriented to person, place, and time. ASSESSMENT/PLAN:      Diagnosis Orders   1. DDD (degenerative disc disease), lumbosacral  HYDROcodone-acetaminophen (NORCO) 5-325 MG per tablet   2. Colon cancer screening  Cologuard   3. Chronic bilateral low back pain without sciatica  HYDROcodone-acetaminophen (NORCO) 5-325 MG per tablet   4. Ankylosing spondylitis of lumbosacral region (HCC)  HYDROcodone-acetaminophen (NORCO) 5-325 MG per tablet   5. Needs flu shot  INFLUENZA, MDCK QUADV, 2 YRS AND OLDER, IM, PF, PREFILL SYR OR SDV, 0.5ML (FLUCELVAX QUADV, PF)   6. Tobacco abuse     7. Obstructive sleep apnea syndrome           Hypertension adequate reading continue current Rx  Lipid disorder continue current Rx   Chronic pain over all function & try use Rx sporadic when less pain in back  Update contract Control Rx  oarrs check & Rx Norco  Flu shot  Tobacco use aware of risk & complications  Advice due for CT lung screen   Visit 30 minutes  Visit 3 month

## 2021-11-12 DIAGNOSIS — E78.00 HYPERCHOLESTEROLEMIA: ICD-10-CM

## 2021-11-12 RX ORDER — AMLODIPINE BESYLATE 10 MG/1
10 TABLET ORAL DAILY
Qty: 90 TABLET | Refills: 1 | Status: SHIPPED | OUTPATIENT
Start: 2021-11-12 | End: 2022-05-08 | Stop reason: SDUPTHER

## 2021-11-12 RX ORDER — ATORVASTATIN CALCIUM 20 MG/1
20 TABLET, FILM COATED ORAL DAILY
Qty: 90 TABLET | Refills: 1 | Status: SHIPPED | OUTPATIENT
Start: 2021-11-12 | End: 2022-05-08 | Stop reason: SDUPTHER

## 2021-11-12 RX ORDER — LOSARTAN POTASSIUM 50 MG/1
50 TABLET ORAL DAILY
Qty: 90 TABLET | Refills: 1 | Status: SHIPPED | OUTPATIENT
Start: 2021-11-12 | End: 2022-05-08 | Stop reason: SDUPTHER

## 2021-11-12 NOTE — TELEPHONE ENCOUNTER
Medication:   Requested Prescriptions     Pending Prescriptions Disp Refills    atorvastatin (LIPITOR) 20 MG tablet 90 tablet 1     Sig: Take 1 tablet by mouth daily    amLODIPine (NORVASC) 10 MG tablet 90 tablet 1     Sig: Take 1 tablet by mouth daily    losartan (COZAAR) 50 MG tablet 90 tablet 1     Sig: Take 1 tablet by mouth daily     Last Filled:  6.14.21  Last appt: 9/24/2021   Next appt: 12/13/2021    Last Lipid:   Lab Results   Component Value Date    CHOL 160 12/14/2020    TRIG 92 12/14/2020    HDL 35 12/14/2020    LDLCALC 107 12/14/2020

## 2021-12-13 ENCOUNTER — OFFICE VISIT (OUTPATIENT)
Dept: PRIMARY CARE CLINIC | Age: 61
End: 2021-12-13
Payer: MEDICARE

## 2021-12-13 VITALS
WEIGHT: 205.2 LBS | OXYGEN SATURATION: 99 % | HEART RATE: 97 BPM | TEMPERATURE: 98.3 F | HEIGHT: 67 IN | SYSTOLIC BLOOD PRESSURE: 146 MMHG | BODY MASS INDEX: 32.21 KG/M2 | DIASTOLIC BLOOD PRESSURE: 78 MMHG

## 2021-12-13 DIAGNOSIS — I10 PRIMARY HYPERTENSION: ICD-10-CM

## 2021-12-13 DIAGNOSIS — G47.33 OBSTRUCTIVE SLEEP APNEA SYNDROME: ICD-10-CM

## 2021-12-13 DIAGNOSIS — E78.9 LIPID DISORDER: ICD-10-CM

## 2021-12-13 DIAGNOSIS — Z72.0 TOBACCO ABUSE: ICD-10-CM

## 2021-12-13 DIAGNOSIS — N40.0 BENIGN PROSTATIC HYPERPLASIA WITHOUT LOWER URINARY TRACT SYMPTOMS: ICD-10-CM

## 2021-12-13 DIAGNOSIS — R73.09 HIGH GLUCOSE: ICD-10-CM

## 2021-12-13 DIAGNOSIS — M45.7 ANKYLOSING SPONDYLITIS OF LUMBOSACRAL REGION (HCC): Primary | ICD-10-CM

## 2021-12-13 PROCEDURE — 3017F COLORECTAL CA SCREEN DOC REV: CPT | Performed by: FAMILY MEDICINE

## 2021-12-13 PROCEDURE — 4004F PT TOBACCO SCREEN RCVD TLK: CPT | Performed by: FAMILY MEDICINE

## 2021-12-13 PROCEDURE — 99214 OFFICE O/P EST MOD 30 MIN: CPT | Performed by: FAMILY MEDICINE

## 2021-12-13 PROCEDURE — G8417 CALC BMI ABV UP PARAM F/U: HCPCS | Performed by: FAMILY MEDICINE

## 2021-12-13 PROCEDURE — G8482 FLU IMMUNIZE ORDER/ADMIN: HCPCS | Performed by: FAMILY MEDICINE

## 2021-12-13 PROCEDURE — G8427 DOCREV CUR MEDS BY ELIG CLIN: HCPCS | Performed by: FAMILY MEDICINE

## 2021-12-13 RX ORDER — PREGABALIN 75 MG/1
75 CAPSULE ORAL 2 TIMES DAILY
Qty: 60 CAPSULE | Refills: 1 | Status: SHIPPED | OUTPATIENT
Start: 2021-12-13 | End: 2022-03-11 | Stop reason: SDUPTHER

## 2021-12-13 RX ORDER — HYDROCODONE BITARTRATE AND ACETAMINOPHEN 5; 325 MG/1; MG/1
TABLET ORAL
COMMUNITY
Start: 2019-06-06 | End: 2022-06-27

## 2021-12-13 ASSESSMENT — ENCOUNTER SYMPTOMS
ALLERGIC/IMMUNOLOGIC NEGATIVE: 1
EYES NEGATIVE: 1
DIARRHEA: 0
NAUSEA: 0
ABDOMINAL PAIN: 0
VOMITING: 0

## 2021-12-13 NOTE — PROGRESS NOTES
SUBJECTIVE:  Patient ID: Mendy Simmons is a 64 y.o. male. Chief Complaint:  Chief Complaint   Patient presents with    3 Month Follow-Up       HPI   64year old Male  Chronic back & neck pain  Request try new Rx for pain instead Rx Norco    Past Medical History:   Diagnosis Date    CVA (cerebral infarction) -    hosp x 2 days [address]    Hypertension     Sleep apnea      No past surgical history on file.   No Known Allergies    Family History   Problem Relation Age of Onset    High Blood Pressure Mother          age 68    High Blood Pressure Father          age 47    High Cholesterol Father    [place] Cancer Sister         Brain age 79    Sleep Apnea Sister    Garcia Cancer Brother          age 79    Sleep Apnea Brother     Heart Attack Brother         age 52    [de-identified] Brother         age 8o >neck recovered     Social History     Social History Narrative    3 G children    Youngest son 25 y o lives with him    Wife +FT job she will retire     +Tobacco use    Beer 6-8 /week         Patient Active Problem List   Diagnosis    History of CVA (cerebrovascular accident)    Obstructive sleep apnea syndrome    Ankylosing spondylitis ([address]    Hypertension    Tobacco abuse    DDD (degenerative disc disease), cervical    DDD (degenerative disc disease), lumbosacral    Sacroiliac joint dysfunction    Chronic back pain    Family history of coronary artery disease    Vitamin D deficiency    Diverticulosis of intestine without bleeding    Coronary artery calcification    Emphysema/COPD (HCC)    Adenomatous polyp    Lipid disorder     Current Outpatient Medications   Medication Sig Dispense Refill    HYDROcodone-acetaminophen (NORCO) 5-325 MG per tablet       atorvastatin (LIPITOR) 20 MG tablet Take 1 tablet by mouth daily 90 tablet 1    amLODIPine (NORVASC) 10 MG tablet Take 1 tablet by mouth daily 90 tablet 1    losartan (COZAAR) 50 MG tablet Take 1 tablet by mouth daily 90 tablet 1  vitamin D (ERGOCALCIFEROL) 44379 UNITS CAPS capsule Take 1 capsule by mouth once a week 12 capsule 1    aspirin 325 MG tablet Take 325 mg by mouth daily.  amLODIPine-valsartan-HCTZ (EXFORGE HCT) -12.5 MG TABS One tablet qd (Patient not taking: Reported on 12/13/2021) 90 tablet 1     No current facility-administered medications for this visit. Lab Results   Component Value Date    WBC 8.0 12/14/2020    HGB 14.6 12/14/2020    HCT 42.5 12/14/2020    MCV 82.9 12/14/2020     12/14/2020     Lab Results   Component Value Date    CHOL 160 12/14/2020    CHOL 130 12/04/2019    CHOL 189 10/17/2018     Lab Results   Component Value Date    TRIG 92 12/14/2020    TRIG 102 12/04/2019    TRIG 118 10/17/2018     Lab Results   Component Value Date    HDL 35 (L) 12/14/2020    HDL 42 12/04/2019    HDL 45 10/17/2018     Lab Results   Component Value Date    LDLCALC 107 (H) 12/14/2020    LDLCALC 68 12/04/2019    LDLCALC 120 (H) 10/17/2018     Lab Results   Component Value Date    LABVLDL 18 12/14/2020    LABVLDL 20 12/04/2019    LABVLDL 24 10/17/2018     Lab Results   Component Value Date    CHOLHDLRATIO 4.0 10/24/2016       Chemistry        Component Value Date/Time     12/14/2020 0942    K 4.5 12/14/2020 0942     12/14/2020 0942    CO2 27 12/14/2020 0942    BUN 14 12/14/2020 0942    CREATININE 0.9 12/14/2020 0942        Component Value Date/Time    CALCIUM 10.0 12/14/2020 0942    ALKPHOS 123 12/14/2020 0942    AST 18 12/14/2020 0942    ALT 16 12/14/2020 0942    BILITOT 0.4 12/14/2020 0942        Lab Results   Component Value Date    PSA 2.47 12/14/2020    PSA 2.00 12/04/2019    PSA 1.77 10/17/2018     Lab Results   Component Value Date    LABA1C 6.6 12/14/2020     Lab Results   Component Value Date    .7 12/14/2020     Lab Results   Component Value Date    TSH 1.11 10/23/2017         Review of Systems   Constitutional:        Try   Walk & do some chores   HENT: Negative.          Hard of hearing   Eyes: Negative. Cataract Rt no surgery   Respiratory:        Tobacco use   Dx COPD  Never needed inhaler   Cardiovascular: Negative for chest pain, palpitations and leg swelling. Gastrointestinal: Negative for abdominal pain, diarrhea, nausea and vomiting. Genitourinary: Negative for dysuria and flank pain. Musculoskeletal: Negative for gait problem. Chronic back lower & Neck  Dx Ankylosing Spondylitis   Allergic/Immunologic: Negative. Neurological: Negative for dizziness and headaches. Hematological: Negative. Psychiatric/Behavioral: Negative for behavioral problems, confusion, self-injury and sleep disturbance. The patient is not nervous/anxious. OBJECTIVE:  BP (!) 146/78 (Site: Right Upper Arm, Position: Sitting, Cuff Size: Medium Adult)   Pulse 97   Temp 98.3 °F (36.8 °C) (Oral)   Ht 5' 7\" (1.702 m)   Wt 205 lb 3.2 oz (93.1 kg)   SpO2 99%   BMI 32.14 kg/m²   Physical Exam  Constitutional:       Comments: BMI 32   HENT:      Head: Normocephalic. Eyes:      Extraocular Movements: Extraocular movements intact. Pupils: Pupils are equal, round, and reactive to light. Cardiovascular:      Rate and Rhythm: Normal rate and regular rhythm. Heart sounds: Normal heart sounds. Pulmonary:      Effort: Pulmonary effort is normal.      Breath sounds: Rhonchi present. Musculoskeletal:      Cervical back: Normal range of motion and neck supple. Right lower leg: No edema. Left lower leg: No edema. Skin:     Findings: No erythema or rash. Neurological:      General: No focal deficit present. Mental Status: He is alert and oriented to person, place, and time. Psychiatric:         Behavior: Behavior normal.         Thought Content: Thought content normal.         Judgment: Judgment normal.         ASSESSMENT/PLAN:      Diagnosis Orders   1. Ankylosing spondylitis of lumbosacral region (HCC)  pregabalin (LYRICA) 75 MG capsule   2.  Primary hypertension  CBC Auto Differential    Comprehensive Metabolic Panel   3. Tobacco abuse     4. Lipid disorder  CBC Auto Differential    Comprehensive Metabolic Panel    Lipid Panel   5. High glucose  Hemoglobin A1C   6. Benign prostatic hyperplasia without lower urinary tract symptoms  PSA, Prostatic Specific Antigen   7.  Obstructive sleep apnea syndrome           Covid Vaccine done X 2   [place] guard @Home encourage to do it & send  Get CT Chest order Epic 3/15/2021  Flu shot done   Hypertension BP reading adequate continue current Rx  Lipid disorder due for lab update continue current Rx  Chronic back D/C Norco Trial Rx Lyrica  Sleep Apnea +CPAP daily use  Visit 30 m

## 2022-03-11 DIAGNOSIS — M45.7 ANKYLOSING SPONDYLITIS OF LUMBOSACRAL REGION (HCC): ICD-10-CM

## 2022-03-11 NOTE — TELEPHONE ENCOUNTER
Medication:   Requested Prescriptions     Pending Prescriptions Disp Refills    pregabalin (LYRICA) 75 MG capsule 60 capsule 1     Sig: Take 1 capsule by mouth 2 times daily for 60 days. Last appt: 12/13/2021   Next appt: Visit date not found    Last OARRS:   RX Monitoring 9/24/2021   Attestation -   Periodic Controlled Substance Monitoring -   Chronic Pain > 50 MEDD Re-evaluated the status of the patient's underlying condition causing pain.

## 2022-03-14 RX ORDER — PREGABALIN 75 MG/1
75 CAPSULE ORAL 2 TIMES DAILY
Qty: 60 CAPSULE | Refills: 1 | Status: SHIPPED | OUTPATIENT
Start: 2022-03-14 | End: 2022-06-27 | Stop reason: SDUPTHER

## 2022-05-08 DIAGNOSIS — E78.00 HYPERCHOLESTEROLEMIA: ICD-10-CM

## 2022-05-09 RX ORDER — ATORVASTATIN CALCIUM 20 MG/1
20 TABLET, FILM COATED ORAL DAILY
Qty: 90 TABLET | Refills: 0 | Status: SHIPPED | OUTPATIENT
Start: 2022-05-09 | End: 2022-07-22

## 2022-05-09 RX ORDER — LOSARTAN POTASSIUM 50 MG/1
50 TABLET ORAL DAILY
Qty: 90 TABLET | Refills: 0 | Status: SHIPPED | OUTPATIENT
Start: 2022-05-09 | End: 2022-07-22

## 2022-05-09 RX ORDER — AMLODIPINE BESYLATE 10 MG/1
10 TABLET ORAL DAILY
Qty: 90 TABLET | Refills: 0 | Status: SHIPPED | OUTPATIENT
Start: 2022-05-09 | End: 2022-07-22

## 2022-05-09 NOTE — TELEPHONE ENCOUNTER
Medication:   Requested Prescriptions     Pending Prescriptions Disp Refills    atorvastatin (LIPITOR) 20 MG tablet 90 tablet 1     Sig: Take 1 tablet by mouth daily    amLODIPine (NORVASC) 10 MG tablet 90 tablet 1     Sig: Take 1 tablet by mouth daily    losartan (COZAAR) 50 MG tablet 90 tablet 1     Sig: Take 1 tablet by mouth daily     Last Filled:  11.12.21  Last appt: 12/13/2021   Next appt: Visit date not found    Last Lipid:   Lab Results   Component Value Date    CHOL 160 12/14/2020    TRIG 92 12/14/2020    HDL 35 12/14/2020    LDLCALC 107 12/14/2020

## 2022-06-01 DIAGNOSIS — N40.0 BENIGN PROSTATIC HYPERPLASIA WITHOUT LOWER URINARY TRACT SYMPTOMS: ICD-10-CM

## 2022-06-01 DIAGNOSIS — I10 PRIMARY HYPERTENSION: ICD-10-CM

## 2022-06-01 DIAGNOSIS — E78.9 LIPID DISORDER: ICD-10-CM

## 2022-06-01 DIAGNOSIS — R73.09 HIGH GLUCOSE: ICD-10-CM

## 2022-06-01 LAB
A/G RATIO: 1.5 (ref 1.1–2.2)
ALBUMIN SERPL-MCNC: 4.3 G/DL (ref 3.4–5)
ALP BLD-CCNC: 147 U/L (ref 40–129)
ALT SERPL-CCNC: 21 U/L (ref 10–40)
ANION GAP SERPL CALCULATED.3IONS-SCNC: 14 MMOL/L (ref 3–16)
AST SERPL-CCNC: 18 U/L (ref 15–37)
BASOPHILS ABSOLUTE: 0.1 K/UL (ref 0–0.2)
BASOPHILS RELATIVE PERCENT: 0.9 %
BILIRUB SERPL-MCNC: 0.6 MG/DL (ref 0–1)
BUN BLDV-MCNC: 14 MG/DL (ref 7–20)
CALCIUM SERPL-MCNC: 9.4 MG/DL (ref 8.3–10.6)
CHLORIDE BLD-SCNC: 99 MMOL/L (ref 99–110)
CHOLESTEROL, TOTAL: 165 MG/DL (ref 0–199)
CO2: 23 MMOL/L (ref 21–32)
CREAT SERPL-MCNC: 0.9 MG/DL (ref 0.8–1.3)
EOSINOPHILS ABSOLUTE: 0.3 K/UL (ref 0–0.6)
EOSINOPHILS RELATIVE PERCENT: 2.9 %
GFR AFRICAN AMERICAN: >60
GFR NON-AFRICAN AMERICAN: >60
GLUCOSE BLD-MCNC: 121 MG/DL (ref 70–99)
HCT VFR BLD CALC: 45.3 % (ref 40.5–52.5)
HDLC SERPL-MCNC: 43 MG/DL (ref 40–60)
HEMOGLOBIN: 15.4 G/DL (ref 13.5–17.5)
LDL CHOLESTEROL CALCULATED: 101 MG/DL
LYMPHOCYTES ABSOLUTE: 1.9 K/UL (ref 1–5.1)
LYMPHOCYTES RELATIVE PERCENT: 20.7 %
MCH RBC QN AUTO: 28.3 PG (ref 26–34)
MCHC RBC AUTO-ENTMCNC: 34 G/DL (ref 31–36)
MCV RBC AUTO: 83.1 FL (ref 80–100)
MONOCYTES ABSOLUTE: 0.6 K/UL (ref 0–1.3)
MONOCYTES RELATIVE PERCENT: 7 %
NEUTROPHILS ABSOLUTE: 6.1 K/UL (ref 1.7–7.7)
NEUTROPHILS RELATIVE PERCENT: 68.5 %
PDW BLD-RTO: 14.4 % (ref 12.4–15.4)
PLATELET # BLD: 347 K/UL (ref 135–450)
PMV BLD AUTO: 6.7 FL (ref 5–10.5)
POTASSIUM SERPL-SCNC: 4.6 MMOL/L (ref 3.5–5.1)
PROSTATE SPECIFIC ANTIGEN: 2.12 NG/ML (ref 0–4)
RBC # BLD: 5.45 M/UL (ref 4.2–5.9)
SODIUM BLD-SCNC: 136 MMOL/L (ref 136–145)
TOTAL PROTEIN: 7.2 G/DL (ref 6.4–8.2)
TRIGL SERPL-MCNC: 104 MG/DL (ref 0–150)
VLDLC SERPL CALC-MCNC: 21 MG/DL
WBC # BLD: 9 K/UL (ref 4–11)

## 2022-06-02 LAB
ESTIMATED AVERAGE GLUCOSE: 142.7 MG/DL
HBA1C MFR BLD: 6.6 %

## 2022-06-02 SDOH — HEALTH STABILITY: PHYSICAL HEALTH: ON AVERAGE, HOW MANY MINUTES DO YOU ENGAGE IN EXERCISE AT THIS LEVEL?: 10 MIN

## 2022-06-02 SDOH — HEALTH STABILITY: PHYSICAL HEALTH: ON AVERAGE, HOW MANY DAYS PER WEEK DO YOU ENGAGE IN MODERATE TO STRENUOUS EXERCISE (LIKE A BRISK WALK)?: 2 DAYS

## 2022-06-02 ASSESSMENT — PATIENT HEALTH QUESTIONNAIRE - PHQ9
SUM OF ALL RESPONSES TO PHQ9 QUESTIONS 1 & 2: 1
2. FEELING DOWN, DEPRESSED OR HOPELESS: 1
1. LITTLE INTEREST OR PLEASURE IN DOING THINGS: 0
SUM OF ALL RESPONSES TO PHQ QUESTIONS 1-9: 1

## 2022-06-02 ASSESSMENT — LIFESTYLE VARIABLES
HOW OFTEN DURING THE LAST YEAR HAVE YOU FOUND THAT YOU WERE NOT ABLE TO STOP DRINKING ONCE YOU HAD STARTED: 0
HOW OFTEN DURING THE LAST YEAR HAVE YOU HAD A FEELING OF GUILT OR REMORSE AFTER DRINKING: 0
HOW MANY STANDARD DRINKS CONTAINING ALCOHOL DO YOU HAVE ON A TYPICAL DAY: 3 OR 4
HAVE YOU OR SOMEONE ELSE BEEN INJURED AS A RESULT OF YOUR DRINKING: 0
HOW OFTEN DURING THE LAST YEAR HAVE YOU NEEDED AN ALCOHOLIC DRINK FIRST THING IN THE MORNING TO GET YOURSELF GOING AFTER A NIGHT OF HEAVY DRINKING: 0
HAS A RELATIVE, FRIEND, DOCTOR, OR ANOTHER HEALTH PROFESSIONAL EXPRESSED CONCERN ABOUT YOUR DRINKING OR SUGGESTED YOU CUT DOWN: 0
HOW OFTEN DURING THE LAST YEAR HAVE YOU BEEN UNABLE TO REMEMBER WHAT HAPPENED THE NIGHT BEFORE BECAUSE YOU HAD BEEN DRINKING: 0
HOW OFTEN DURING THE LAST YEAR HAVE YOU FAILED TO DO WHAT WAS NORMALLY EXPECTED FROM YOU BECAUSE OF DRINKING: 0
HOW OFTEN DO YOU HAVE A DRINK CONTAINING ALCOHOL: 2-3 TIMES A WEEK

## 2022-06-20 SDOH — HEALTH STABILITY: PHYSICAL HEALTH: ON AVERAGE, HOW MANY MINUTES DO YOU ENGAGE IN EXERCISE AT THIS LEVEL?: 10 MIN

## 2022-06-20 SDOH — HEALTH STABILITY: PHYSICAL HEALTH: ON AVERAGE, HOW MANY DAYS PER WEEK DO YOU ENGAGE IN MODERATE TO STRENUOUS EXERCISE (LIKE A BRISK WALK)?: 2 DAYS

## 2022-06-20 ASSESSMENT — LIFESTYLE VARIABLES
HOW OFTEN DO YOU HAVE A DRINK CONTAINING ALCOHOL: 2-3 TIMES A WEEK
HOW OFTEN DURING THE LAST YEAR HAVE YOU NEEDED AN ALCOHOLIC DRINK FIRST THING IN THE MORNING TO GET YOURSELF GOING AFTER A NIGHT OF HEAVY DRINKING: NEVER
HOW OFTEN DO YOU HAVE A DRINK CONTAINING ALCOHOL: 4
HOW OFTEN DURING THE LAST YEAR HAVE YOU FOUND THAT YOU WERE NOT ABLE TO STOP DRINKING ONCE YOU HAD STARTED: NEVER
HOW OFTEN DO YOU HAVE SIX OR MORE DRINKS ON ONE OCCASION: 3
HOW OFTEN DURING THE LAST YEAR HAVE YOU FAILED TO DO WHAT WAS NORMALLY EXPECTED FROM YOU BECAUSE OF DRINKING: 0
HOW OFTEN DURING THE LAST YEAR HAVE YOU FAILED TO DO WHAT WAS NORMALLY EXPECTED FROM YOU BECAUSE OF DRINKING: NEVER
HOW OFTEN DURING THE LAST YEAR HAVE YOU BEEN UNABLE TO REMEMBER WHAT HAPPENED THE NIGHT BEFORE BECAUSE YOU HAD BEEN DRINKING: NEVER
HAS A RELATIVE, FRIEND, DOCTOR, OR ANOTHER HEALTH PROFESSIONAL EXPRESSED CONCERN ABOUT YOUR DRINKING OR SUGGESTED YOU CUT DOWN: NO
HOW OFTEN DURING THE LAST YEAR HAVE YOU FOUND THAT YOU WERE NOT ABLE TO STOP DRINKING ONCE YOU HAD STARTED: 0
HAS A RELATIVE, FRIEND, DOCTOR, OR ANOTHER HEALTH PROFESSIONAL EXPRESSED CONCERN ABOUT YOUR DRINKING OR SUGGESTED YOU CUT DOWN: 0
HOW OFTEN DURING THE LAST YEAR HAVE YOU HAD A FEELING OF GUILT OR REMORSE AFTER DRINKING: 0
HOW OFTEN DURING THE LAST YEAR HAVE YOU HAD A FEELING OF GUILT OR REMORSE AFTER DRINKING: NEVER
HOW OFTEN DURING THE LAST YEAR HAVE YOU BEEN UNABLE TO REMEMBER WHAT HAPPENED THE NIGHT BEFORE BECAUSE YOU HAD BEEN DRINKING: 0
HOW MANY STANDARD DRINKS CONTAINING ALCOHOL DO YOU HAVE ON A TYPICAL DAY: 3 OR 4
HAVE YOU OR SOMEONE ELSE BEEN INJURED AS A RESULT OF YOUR DRINKING: 0
HOW OFTEN DURING THE LAST YEAR HAVE YOU NEEDED AN ALCOHOLIC DRINK FIRST THING IN THE MORNING TO GET YOURSELF GOING AFTER A NIGHT OF HEAVY DRINKING: 0
HOW MANY STANDARD DRINKS CONTAINING ALCOHOL DO YOU HAVE ON A TYPICAL DAY: 2
HAVE YOU OR SOMEONE ELSE BEEN INJURED AS A RESULT OF YOUR DRINKING: NO

## 2022-06-20 ASSESSMENT — PATIENT HEALTH QUESTIONNAIRE - PHQ9
SUM OF ALL RESPONSES TO PHQ QUESTIONS 1-9: 1
SUM OF ALL RESPONSES TO PHQ9 QUESTIONS 1 & 2: 1
SUM OF ALL RESPONSES TO PHQ QUESTIONS 1-9: 1
1. LITTLE INTEREST OR PLEASURE IN DOING THINGS: 0
2. FEELING DOWN, DEPRESSED OR HOPELESS: 1

## 2022-06-27 ENCOUNTER — OFFICE VISIT (OUTPATIENT)
Dept: PRIMARY CARE CLINIC | Age: 62
End: 2022-06-27
Payer: MEDICARE

## 2022-06-27 VITALS
WEIGHT: 205.6 LBS | BODY MASS INDEX: 35.1 KG/M2 | OXYGEN SATURATION: 98 % | HEART RATE: 98 BPM | HEIGHT: 64 IN | DIASTOLIC BLOOD PRESSURE: 80 MMHG | SYSTOLIC BLOOD PRESSURE: 138 MMHG | TEMPERATURE: 97.5 F

## 2022-06-27 DIAGNOSIS — Z72.0 TOBACCO USE: ICD-10-CM

## 2022-06-27 DIAGNOSIS — M51.37 DDD (DEGENERATIVE DISC DISEASE), LUMBOSACRAL: ICD-10-CM

## 2022-06-27 DIAGNOSIS — J42 CHRONIC BRONCHITIS, UNSPECIFIED CHRONIC BRONCHITIS TYPE (HCC): ICD-10-CM

## 2022-06-27 DIAGNOSIS — B35.1 TOENAIL FUNGUS: ICD-10-CM

## 2022-06-27 DIAGNOSIS — Z00.00 ENCOUNTER FOR SUBSEQUENT ANNUAL WELLNESS VISIT (AWV) IN MEDICARE PATIENT: Primary | ICD-10-CM

## 2022-06-27 DIAGNOSIS — M54.50 CHRONIC BILATERAL LOW BACK PAIN WITHOUT SCIATICA: ICD-10-CM

## 2022-06-27 DIAGNOSIS — G89.29 CHRONIC BILATERAL LOW BACK PAIN WITHOUT SCIATICA: ICD-10-CM

## 2022-06-27 DIAGNOSIS — R73.03 PRE-DIABETES: ICD-10-CM

## 2022-06-27 DIAGNOSIS — M45.7 ANKYLOSING SPONDYLITIS OF LUMBOSACRAL REGION (HCC): ICD-10-CM

## 2022-06-27 PROCEDURE — G0439 PPPS, SUBSEQ VISIT: HCPCS | Performed by: FAMILY MEDICINE

## 2022-06-27 PROCEDURE — 3017F COLORECTAL CA SCREEN DOC REV: CPT | Performed by: FAMILY MEDICINE

## 2022-06-27 RX ORDER — PREGABALIN 75 MG/1
75 CAPSULE ORAL 2 TIMES DAILY
Qty: 60 CAPSULE | Refills: 2 | Status: SHIPPED | OUTPATIENT
Start: 2022-06-27 | End: 2022-08-10 | Stop reason: SDUPTHER

## 2022-06-27 SDOH — ECONOMIC STABILITY: FOOD INSECURITY: WITHIN THE PAST 12 MONTHS, YOU WORRIED THAT YOUR FOOD WOULD RUN OUT BEFORE YOU GOT MONEY TO BUY MORE.: NEVER TRUE

## 2022-06-27 SDOH — ECONOMIC STABILITY: FOOD INSECURITY: WITHIN THE PAST 12 MONTHS, THE FOOD YOU BOUGHT JUST DIDN'T LAST AND YOU DIDN'T HAVE MONEY TO GET MORE.: NEVER TRUE

## 2022-06-27 ASSESSMENT — ENCOUNTER SYMPTOMS
EYES NEGATIVE: 1
BACK PAIN: 1
ALLERGIC/IMMUNOLOGIC NEGATIVE: 1

## 2022-06-27 ASSESSMENT — LIFESTYLE VARIABLES
HOW OFTEN DO YOU HAVE A DRINK CONTAINING ALCOHOL: 2-3 TIMES A WEEK
HOW MANY STANDARD DRINKS CONTAINING ALCOHOL DO YOU HAVE ON A TYPICAL DAY: 3 OR 4

## 2022-06-27 ASSESSMENT — SOCIAL DETERMINANTS OF HEALTH (SDOH): HOW HARD IS IT FOR YOU TO PAY FOR THE VERY BASICS LIKE FOOD, HOUSING, MEDICAL CARE, AND HEATING?: NOT HARD AT ALL

## 2022-06-27 NOTE — PROGRESS NOTES
SUBJECTIVE:  Patient ID: Zak Li is a 58 y.o. male. Chief Complaint:  Chief Complaint   Patient presents with    Medicare AWV    Other     [facility] [address]       HPI   58year old Male  AWV  Dx Ankylosing Spondylitis   +SSD     Past Medical History:   Diagnosis Date    CVA (cerebral infarction)     hosp x 2 days [address]    Hypertension     Sleep apnea      History reviewed. No pertinent surgical history. No Known Allergies    Family History   Problem Relation Age of Onset    High Blood Pressure Mother          age 68    High Blood Pressure Father          age 47    High Cholesterol Father    [employer] Cancer Sister         Brain age 79    Sleep Apnea Sister    [employer] Cancer Brother          age 79    Sleep Apnea Brother     Heart Attack Brother         age 52    [de-identified] Brother         age 8o >neck recovered     Social History     Social History Narrative    3 G children    Youngest son 25 y o lives with him    Wife +retire -    +Tobacco use    Beer 6-8 /week       Patient Active Problem List   Diagnosis    History of CVA (cerebrovascular accident)    Obstructive sleep apnea syndrome    [address]ylosing spondylitis ([facility] [address]    Hypertension    Tobacco abuse    DDD (degenerative disc disease), cervical    DDD (degenerative disc disease), lumbosacral    Sacroiliac joint dysfunction    Chronic back pain    Family history of coronary artery disease    Vitamin D deficiency    Diverticulosis of intestine without bleeding    Coronary artery calcification    Adenomatous polyp    Lipid disorder     Current Outpatient Medications   Medication Sig Dispense Refill    pregabalin (LYRICA) 75 MG capsule Take 1 capsule by mouth 2 times daily for 90 days.  60 capsule 2    atorvastatin (LIPITOR) 20 MG tablet Take 1 tablet by mouth daily 90 tablet 0    amLODIPine (NORVASC) 10 MG tablet Take 1 tablet by mouth daily 90 tablet 0    losartan (COZAAR) 50 MG tablet Take 1 tablet by mouth daily 90 tablet 0    vitamin D (ERGOCALCIFEROL) 37206 UNITS CAPS capsule Take 1 capsule by mouth once a week 12 capsule 1    aspirin 325 MG tablet Take 325 mg by mouth daily. No current facility-administered medications for this visit. Lab Results   Component Value Date    WBC 9.0 06/01/2022    HGB 15.4 06/01/2022    HCT 45.3 06/01/2022    MCV 83.1 06/01/2022     06/01/2022     Lab Results   Component Value Date    CHOL 165 06/01/2022    CHOL 160 12/14/2020    CHOL 130 12/04/2019     Lab Results   Component Value Date    TRIG 104 06/01/2022    TRIG 92 12/14/2020    TRIG 102 12/04/2019     Lab Results   Component Value Date    HDL 43 06/01/2022    HDL 35 (L) 12/14/2020    HDL 42 12/04/2019     Lab Results   Component Value Date    LDLCALC 101 (H) 06/01/2022    LDLCALC 107 (H) 12/14/2020    LDLCALC 68 12/04/2019     Lab Results   Component Value Date    LABVLDL 21 06/01/2022    LABVLDL 18 12/14/2020    LABVLDL 20 12/04/2019     Lab Results   Component Value Date    CHOLHDLRATIO 4.0 10/24/2016       Chemistry        Component Value Date/Time     06/01/2022 0944    K 4.6 06/01/2022 0944    CL 99 06/01/2022 0944    CO2 23 06/01/2022 0944    BUN 14 06/01/2022 0944    CREATININE 0.9 06/01/2022 0944        Component Value Date/Time    CALCIUM 9.4 06/01/2022 0944    ALKPHOS 147 (H) 06/01/2022 0944    AST 18 06/01/2022 0944    ALT 21 06/01/2022 0944    BILITOT 0.6 06/01/2022 0944        Lab Results   Component Value Date    TSH 1.11 10/23/2017     Lab Results   Component Value Date    LABA1C 6.6 06/01/2022     Lab Results   Component Value Date    .7 06/01/2022     Lab Results   Component Value Date    PSA 2.12 06/01/2022    PSA 2.47 12/14/2020    PSA 2.00 12/04/2019         Review of Systems   Constitutional: Negative. Eyes: Negative. Respiratory:        Tobacco use  ? Sleep Apnea  Seen by Dr Jalyn Somers 2019   No F/U  No Inhaler use   Cardiovascular:        Hypertension  Hyperlipidemia   Endocrine:        Dx Pre diabetes  No Rx  Diet Low carb Low sugar   Genitourinary: Negative for dysuria. Musculoskeletal: Positive for back pain. Negative for gait problem. Dx Ankylosing Spondylitis   Skin:        Toe nail disorder   Allergic/Immunologic: Negative. Neurological: Negative. Negative for dizziness and headaches. Hematological: Negative. Psychiatric/Behavioral:        Good support system  Wife  Children       OBJECTIVE:  /80 (Site: Right Upper Arm, Position: Sitting, Cuff Size: Large Adult)   Pulse 98   Temp 97.5 °F (36.4 °C) (Infrared)   Ht 5' 4\" (1.626 m)   Wt 205 lb 9.6 oz (93.3 kg)   SpO2 98%   BMI 35.29 kg/m²   Physical Exam  Constitutional:       Comments: BMI 35   HENT:      Head: Normocephalic. Eyes:      Extraocular Movements: Extraocular movements intact. Pupils: Pupils are equal, round, and reactive to light. Cardiovascular:      Rate and Rhythm: Normal rate and regular rhythm. Pulses: Normal pulses. Heart sounds: Normal heart sounds. Pulmonary:      Breath sounds: Rhonchi present. No wheezing. Musculoskeletal:      Cervical back: Normal range of motion and neck supple. Right lower leg: No edema. Left lower leg: No edema. Skin:     Comments: Toe nails deformed & Excess debris   Neurological:      General: No focal deficit present. Mental Status: He is alert and oriented to person, place, and time. Psychiatric:         Mood and Affect: Mood normal.         Behavior: Behavior normal.         Thought Content: Thought content normal.         Judgment: Judgment normal.         ASSESSMENT/PLAN:      Diagnosis Orders   1. Encounter for subsequent annual wellness visit (AWV) in Medicare patient     2. Ankylosing spondylitis of lumbosacral region (HCC)  pregabalin (LYRICA) 75 MG capsule   3. Tobacco use  CT CHEST WO CONTRAST   4.  Chronic bronchitis, unspecified chronic bronchitis type ([facility] [address]  CT CHEST WO CONTRAST   5. DDD (degenerative disc disease), lumbosacral     6. Chronic bilateral low back pain without sciatica     7. Toenail fungus  AFL - Sary Fuelling, DPM, Podiatry, [place]   8. BMI 35.0-35.9,adult     9.  Pre-diabetes  Diabetic Foot Exam         Dx Ankylosing Spondylitis Pt is off Norco Currently Rx Lyrica  With good result  Hypertension BP adequate continue current Rx Lab update done  Hyperlipidemia Continue current Rx  Lab update done  Tobacco use  Due CT Chest update order given & Epic  Hx Bronchitis  Due FU CT chest  No inhaler use  Pre diabetes Watch carb & sugar intake   Toe Nail Disorder Refer Podiatry

## 2022-07-22 DIAGNOSIS — E78.00 HYPERCHOLESTEROLEMIA: ICD-10-CM

## 2022-07-22 RX ORDER — ATORVASTATIN CALCIUM 20 MG/1
TABLET, FILM COATED ORAL
Qty: 90 TABLET | Refills: 1 | Status: SHIPPED | OUTPATIENT
Start: 2022-07-22

## 2022-07-22 RX ORDER — LOSARTAN POTASSIUM 50 MG/1
TABLET ORAL
Qty: 90 TABLET | Refills: 1 | Status: SHIPPED | OUTPATIENT
Start: 2022-07-22

## 2022-07-22 RX ORDER — AMLODIPINE BESYLATE 10 MG/1
TABLET ORAL
Qty: 90 TABLET | Refills: 1 | Status: SHIPPED | OUTPATIENT
Start: 2022-07-22

## 2022-07-22 NOTE — TELEPHONE ENCOUNTER
Medication:   Requested Prescriptions     Pending Prescriptions Disp Refills    losartan (COZAAR) 50 MG tablet [Pharmacy Med Name: LOSARTAN POTASSIUM 50 MG Tablet] 90 tablet 0     Sig: TAKE 1 TABLET EVERY DAY    atorvastatin (LIPITOR) 20 MG tablet [Pharmacy Med Name: ATORVASTATIN CALCIUM 20 MG Tablet] 90 tablet 0     Sig: TAKE 1 TABLET EVERY DAY    amLODIPine (NORVASC) 10 MG tablet [Pharmacy Med Name: AMLODIPINE BESYLATE 10 MG Tablet] 90 tablet 0     Sig: TAKE 1 TABLET EVERY DAY     Last Filled:  5.9.22    Last appt: 6/27/2022   Next appt: Visit date not found    Last Lipid:   Lab Results   Component Value Date/Time    CHOL 165 06/01/2022 09:44 AM    TRIG 104 06/01/2022 09:44 AM    HDL 43 06/01/2022 09:44 AM    LDLCALC 101 06/01/2022 09:44 AM

## 2022-08-09 DIAGNOSIS — M45.7 ANKYLOSING SPONDYLITIS OF LUMBOSACRAL REGION (HCC): ICD-10-CM

## 2022-08-09 NOTE — TELEPHONE ENCOUNTER
Medication:   Requested Prescriptions     Pending Prescriptions Disp Refills    pregabalin (LYRICA) 75 MG capsule 60 capsule 2     Sig: Take 1 capsule by mouth in the morning and 1 capsule before bedtime. Do all this for 90 days. Last Filled:  6.27.22    Last appt: 6/27/2022   Next appt: Visit date not found    Last OARRS:   RX Monitoring 9/24/2021   Attestation -   Periodic Controlled Substance Monitoring -   Chronic Pain > 50 MEDD Re-evaluated the status of the patient's underlying condition causing pain.

## 2022-08-10 RX ORDER — PREGABALIN 75 MG/1
75 CAPSULE ORAL 2 TIMES DAILY
Qty: 60 CAPSULE | Refills: 2 | Status: SHIPPED | OUTPATIENT
Start: 2022-08-10 | End: 2022-11-08

## 2023-02-27 DIAGNOSIS — E78.00 HYPERCHOLESTEROLEMIA: ICD-10-CM

## 2023-02-27 RX ORDER — AMLODIPINE BESYLATE 10 MG/1
TABLET ORAL
Qty: 90 TABLET | Refills: 0 | Status: SHIPPED | OUTPATIENT
Start: 2023-02-27

## 2023-02-27 RX ORDER — ATORVASTATIN CALCIUM 20 MG/1
TABLET, FILM COATED ORAL
Qty: 90 TABLET | Refills: 0 | Status: SHIPPED | OUTPATIENT
Start: 2023-02-27

## 2023-02-27 RX ORDER — LOSARTAN POTASSIUM 50 MG/1
TABLET ORAL
Qty: 90 TABLET | Refills: 0 | Status: SHIPPED | OUTPATIENT
Start: 2023-02-27

## 2023-07-24 DIAGNOSIS — E78.00 HYPERCHOLESTEROLEMIA: ICD-10-CM

## 2023-07-24 NOTE — TELEPHONE ENCOUNTER
Medication:   Requested Prescriptions     Pending Prescriptions Disp Refills    amLODIPine (NORVASC) 10 MG tablet [Pharmacy Med Name: AMLODIPINE BESYLATE 10 MG Tablet] 90 tablet 0     Sig: TAKE 1 TABLET EVERY DAY    atorvastatin (LIPITOR) 20 MG tablet [Pharmacy Med Name: ATORVASTATIN CALCIUM 20 MG Tablet] 90 tablet 0     Sig: TAKE 1 TABLET EVERY DAY    losartan (COZAAR) 50 MG tablet [Pharmacy Med Name: LOSARTAN POTASSIUM 50 MG Tablet] 90 tablet 0     Sig: TAKE 1 TABLET EVERY DAY     Last Filled:  2.27.23    Last appt: 6/27/2022   Next appt: 8.4.23    Last Lipid:   Lab Results   Component Value Date/Time    CHOL 165 06/01/2022 09:44 AM    TRIG 104 06/01/2022 09:44 AM    HDL 43 06/01/2022 09:44 AM    LDLCALC 101 06/01/2022 09:44 AM

## 2023-07-25 RX ORDER — AMLODIPINE BESYLATE 10 MG/1
TABLET ORAL
Qty: 90 TABLET | Refills: 0 | Status: SHIPPED | OUTPATIENT
Start: 2023-07-25

## 2023-07-25 RX ORDER — ATORVASTATIN CALCIUM 20 MG/1
TABLET, FILM COATED ORAL
Qty: 90 TABLET | Refills: 0 | Status: SHIPPED | OUTPATIENT
Start: 2023-07-25

## 2023-07-25 RX ORDER — LOSARTAN POTASSIUM 50 MG/1
TABLET ORAL
Qty: 90 TABLET | Refills: 0 | Status: SHIPPED | OUTPATIENT
Start: 2023-07-25

## 2023-08-10 ENCOUNTER — OFFICE VISIT (OUTPATIENT)
Dept: PRIMARY CARE CLINIC | Age: 63
End: 2023-08-10

## 2023-08-10 VITALS
OXYGEN SATURATION: 99 % | TEMPERATURE: 97.5 F | WEIGHT: 199 LBS | HEIGHT: 64 IN | DIASTOLIC BLOOD PRESSURE: 78 MMHG | BODY MASS INDEX: 33.97 KG/M2 | HEART RATE: 98 BPM | SYSTOLIC BLOOD PRESSURE: 140 MMHG

## 2023-08-10 DIAGNOSIS — I10 PRIMARY HYPERTENSION: ICD-10-CM

## 2023-08-10 DIAGNOSIS — R73.09 ELEVATED HEMOGLOBIN A1C: ICD-10-CM

## 2023-08-10 DIAGNOSIS — Z13.220 LIPID SCREENING: ICD-10-CM

## 2023-08-10 DIAGNOSIS — M45.7 ANKYLOSING SPONDYLITIS OF LUMBOSACRAL REGION (HCC): ICD-10-CM

## 2023-08-10 DIAGNOSIS — Z00.00 ENCOUNTER FOR SUBSEQUENT ANNUAL WELLNESS VISIT (AWV) IN MEDICARE PATIENT: Primary | ICD-10-CM

## 2023-08-10 DIAGNOSIS — M51.36 DDD (DEGENERATIVE DISC DISEASE), LUMBAR: ICD-10-CM

## 2023-08-10 DIAGNOSIS — N40.0 BENIGN PROSTATIC HYPERPLASIA WITHOUT LOWER URINARY TRACT SYMPTOMS: ICD-10-CM

## 2023-08-10 DIAGNOSIS — Z72.0 TOBACCO USE: ICD-10-CM

## 2023-08-10 DIAGNOSIS — M50.30 DDD (DEGENERATIVE DISC DISEASE), CERVICAL: ICD-10-CM

## 2023-08-10 SDOH — ECONOMIC STABILITY: FOOD INSECURITY: WITHIN THE PAST 12 MONTHS, THE FOOD YOU BOUGHT JUST DIDN'T LAST AND YOU DIDN'T HAVE MONEY TO GET MORE.: NEVER TRUE

## 2023-08-10 SDOH — ECONOMIC STABILITY: FOOD INSECURITY: WITHIN THE PAST 12 MONTHS, YOU WORRIED THAT YOUR FOOD WOULD RUN OUT BEFORE YOU GOT MONEY TO BUY MORE.: NEVER TRUE

## 2023-08-10 SDOH — ECONOMIC STABILITY: INCOME INSECURITY: HOW HARD IS IT FOR YOU TO PAY FOR THE VERY BASICS LIKE FOOD, HOUSING, MEDICAL CARE, AND HEATING?: NOT HARD AT ALL

## 2023-08-10 ASSESSMENT — LIFESTYLE VARIABLES
HAS A RELATIVE, FRIEND, DOCTOR, OR ANOTHER HEALTH PROFESSIONAL EXPRESSED CONCERN ABOUT YOUR DRINKING OR SUGGESTED YOU CUT DOWN: 0
HAVE YOU OR SOMEONE ELSE BEEN INJURED AS A RESULT OF YOUR DRINKING: 0
HOW OFTEN DURING THE LAST YEAR HAVE YOU HAD A FEELING OF GUILT OR REMORSE AFTER DRINKING: 0
HOW OFTEN DURING THE LAST YEAR HAVE YOU FOUND THAT YOU WERE NOT ABLE TO STOP DRINKING ONCE YOU HAD STARTED: 1
HOW OFTEN DURING THE LAST YEAR HAVE YOU FAILED TO DO WHAT WAS NORMALLY EXPECTED FROM YOU BECAUSE OF DRINKING: 1
HOW MANY STANDARD DRINKS CONTAINING ALCOHOL DO YOU HAVE ON A TYPICAL DAY: 3 OR 4
HOW OFTEN DURING THE LAST YEAR HAVE YOU NEEDED AN ALCOHOLIC DRINK FIRST THING IN THE MORNING TO GET YOURSELF GOING AFTER A NIGHT OF HEAVY DRINKING: 0
HOW OFTEN DO YOU HAVE A DRINK CONTAINING ALCOHOL: 2-3 TIMES A WEEK
HOW OFTEN DURING THE LAST YEAR HAVE YOU BEEN UNABLE TO REMEMBER WHAT HAPPENED THE NIGHT BEFORE BECAUSE YOU HAD BEEN DRINKING: 0

## 2023-08-10 ASSESSMENT — PATIENT HEALTH QUESTIONNAIRE - PHQ9
SUM OF ALL RESPONSES TO PHQ QUESTIONS 1-9: 0
SUM OF ALL RESPONSES TO PHQ QUESTIONS 1-9: 0
1. LITTLE INTEREST OR PLEASURE IN DOING THINGS: 0
SUM OF ALL RESPONSES TO PHQ9 QUESTIONS 1 & 2: 0
SUM OF ALL RESPONSES TO PHQ QUESTIONS 1-9: 0
SUM OF ALL RESPONSES TO PHQ QUESTIONS 1-9: 0
2. FEELING DOWN, DEPRESSED OR HOPELESS: 0

## 2023-08-10 ASSESSMENT — ENCOUNTER SYMPTOMS
APNEA: 0
ALLERGIC/IMMUNOLOGIC NEGATIVE: 1
WHEEZING: 0
ROS SKIN COMMENTS: NO DERMATOLOGY
BACK PAIN: 1
CHEST TIGHTNESS: 0
EYES NEGATIVE: 1

## 2024-01-25 DIAGNOSIS — E78.00 HYPERCHOLESTEROLEMIA: ICD-10-CM

## 2024-01-25 NOTE — TELEPHONE ENCOUNTER
Medication:   Requested Prescriptions     Pending Prescriptions Disp Refills    amLODIPine (NORVASC) 10 MG tablet [Pharmacy Med Name: AMLODIPINE BESYLATE 10 MG Tablet] 90 tablet 3     Sig: TAKE 1 TABLET EVERY DAY    atorvastatin (LIPITOR) 20 MG tablet [Pharmacy Med Name: ATORVASTATIN CALCIUM 20 MG Tablet] 90 tablet 3     Sig: TAKE 1 TABLET EVERY DAY    losartan (COZAAR) 50 MG tablet [Pharmacy Med Name: LOSARTAN POTASSIUM 50 MG Tablet] 90 tablet 3     Sig: TAKE 1 TABLET EVERY DAY     Last Filled:  amlodipine - 7/25/2023  Atorvastatin - 7/25/2023  Losartan - 7/25/2023    Last appt: 8/10/2023   Next appt: Visit date not found    Last Lipid:   Lab Results   Component Value Date/Time    CHOL 165 06/01/2022 09:44 AM    TRIG 104 06/01/2022 09:44 AM    HDL 43 06/01/2022 09:44 AM    LDLCALC 101 06/01/2022 09:44 AM

## 2024-01-26 RX ORDER — ATORVASTATIN CALCIUM 20 MG/1
TABLET, FILM COATED ORAL
Qty: 90 TABLET | Refills: 0 | Status: SHIPPED | OUTPATIENT
Start: 2024-01-26

## 2024-01-26 RX ORDER — LOSARTAN POTASSIUM 50 MG/1
TABLET ORAL
Qty: 90 TABLET | Refills: 0 | Status: SHIPPED | OUTPATIENT
Start: 2024-01-26

## 2024-01-26 RX ORDER — AMLODIPINE BESYLATE 10 MG/1
TABLET ORAL
Qty: 90 TABLET | Refills: 1 | Status: SHIPPED | OUTPATIENT
Start: 2024-01-26

## 2024-03-25 ENCOUNTER — OFFICE VISIT (OUTPATIENT)
Dept: PRIMARY CARE CLINIC | Age: 64
End: 2024-03-25

## 2024-03-25 VITALS
WEIGHT: 195 LBS | SYSTOLIC BLOOD PRESSURE: 138 MMHG | HEIGHT: 64 IN | TEMPERATURE: 97.8 F | DIASTOLIC BLOOD PRESSURE: 74 MMHG | BODY MASS INDEX: 33.29 KG/M2 | OXYGEN SATURATION: 97 % | HEART RATE: 101 BPM

## 2024-03-25 DIAGNOSIS — I10 PRIMARY HYPERTENSION: ICD-10-CM

## 2024-03-25 DIAGNOSIS — G47.33 OSA (OBSTRUCTIVE SLEEP APNEA): ICD-10-CM

## 2024-03-25 DIAGNOSIS — M45.7 ANKYLOSING SPONDYLITIS OF LUMBOSACRAL REGION (HCC): ICD-10-CM

## 2024-03-25 DIAGNOSIS — E78.00 HYPERCHOLESTEROLEMIA: ICD-10-CM

## 2024-03-25 DIAGNOSIS — H25.011 CORTICAL AGE-RELATED CATARACT OF RIGHT EYE: Primary | ICD-10-CM

## 2024-03-25 PROBLEM — J42 CHRONIC BRONCHITIS, UNSPECIFIED CHRONIC BRONCHITIS TYPE (HCC): Status: ACTIVE | Noted: 2024-03-25

## 2024-03-25 RX ORDER — PREGABALIN 75 MG/1
75 CAPSULE ORAL 2 TIMES DAILY
Qty: 180 CAPSULE | Refills: 1 | Status: SHIPPED | OUTPATIENT
Start: 2024-03-25 | End: 2024-09-21

## 2024-03-25 ASSESSMENT — PATIENT HEALTH QUESTIONNAIRE - PHQ9
SUM OF ALL RESPONSES TO PHQ QUESTIONS 1-9: 0
2. FEELING DOWN, DEPRESSED OR HOPELESS: NOT AT ALL
SUM OF ALL RESPONSES TO PHQ9 QUESTIONS 1 & 2: 0
SUM OF ALL RESPONSES TO PHQ QUESTIONS 1-9: 0
1. LITTLE INTEREST OR PLEASURE IN DOING THINGS: NOT AT ALL
SUM OF ALL RESPONSES TO PHQ QUESTIONS 1-9: 0
SUM OF ALL RESPONSES TO PHQ QUESTIONS 1-9: 0

## 2024-03-25 ASSESSMENT — ENCOUNTER SYMPTOMS
ALLERGIC/IMMUNOLOGIC NEGATIVE: 1
BACK PAIN: 1
APNEA: 1
SHORTNESS OF BREATH: 0
CHEST TIGHTNESS: 0
WHEEZING: 0

## 2024-03-25 NOTE — PROGRESS NOTES
SUBJECTIVE:  Patient ID: Greg Felix is a 64 y.o. male.  Chief Complaint:  Chief Complaint   Patient presents with    Pre-op Exam     Phacoemulsification with intraocular lens implant right eye  4.2.24  [facility]   Surgeon Leland Henderson MD  Fax: [phone]       HPI  64 year old Male  Rt eye cataract April 2 ,2024      Patient Active Problem List   Diagnosis    History of CVA (cerebrovascular accident)    Obstructive sleep apnea syndrome    Ankylosing spondylitis (HCC)    Hypertension    Tobacco abuse    DDD (degenerative disc disease), cervical    DDD (degenerative disc disease), lumbosacral    Sacroiliac joint dysfunction    Chronic back pain    Family history of coronary artery disease    Vitamin D deficiency    Diverticulosis of intestine without bleeding    Coronary artery calcification    Adenomatous polyp    Lipid disorder     Current Outpatient Medications   Medication Sig Dispense Refill    amLODIPine (NORVASC) 10 MG tablet TAKE 1 TABLET EVERY DAY 90 tablet 1    atorvastatin (LIPITOR) 20 MG tablet TAKE 1 TABLET EVERY DAY 90 tablet 0    losartan (COZAAR) 50 MG tablet TAKE 1 TABLET EVERY DAY 90 tablet 0    vitamin D (ERGOCALCIFEROL) 90428 UNITS CAPS capsule Take 1 capsule by mouth once a week 12 capsule 1    aspirin 325 MG tablet Take 1 tablet by mouth daily      pregabalin (LYRICA) 75 MG capsule TAKE 1 CAPSULE BY MOUTH IN THE MORNING AND 1 CAPSULE BEFORE BEDTIME. DO ALL THIS FOR 90 DAYS. 180 capsule 0     No current facility-administered medications for this visit.     Lab Results   Component Value Date    WBC 9.0 06/01/2022    HGB 15.4 06/01/2022    HCT 45.3 06/01/2022    MCV 83.1 06/01/2022     06/01/2022     Lab Results   Component Value Date    CHOL 165 06/01/2022    CHOL 160 12/14/2020    CHOL 130 12/04/2019     Lab Results   Component Value Date    TRIG 104 06/01/2022    TRIG 92 12/14/2020    TRIG 102 12/04/2019     Lab Results   Component Value Date    HDL 43 06/01/2022    HDL 35 (L)

## 2024-04-08 DIAGNOSIS — E78.00 HYPERCHOLESTEROLEMIA: ICD-10-CM

## 2024-04-08 RX ORDER — LOSARTAN POTASSIUM 50 MG/1
TABLET ORAL
Qty: 90 TABLET | Refills: 1 | Status: SHIPPED | OUTPATIENT
Start: 2024-04-08

## 2024-04-08 RX ORDER — ATORVASTATIN CALCIUM 20 MG/1
TABLET, FILM COATED ORAL
Qty: 90 TABLET | Refills: 1 | Status: SHIPPED | OUTPATIENT
Start: 2024-04-08

## 2024-04-08 NOTE — TELEPHONE ENCOUNTER
Medication:   Requested Prescriptions     Pending Prescriptions Disp Refills    atorvastatin (LIPITOR) 20 MG tablet [Pharmacy Med Name: ATORVASTATIN CALCIUM 20 MG Tablet] 90 tablet 1     Sig: TAKE 1 TABLET EVERY DAY    losartan (COZAAR) 50 MG tablet [Pharmacy Med Name: LOSARTAN POTASSIUM 50 MG Tablet] 90 tablet 1     Sig: TAKE 1 TABLET EVERY DAY     Last Filled:  atorvastatin - 1/26/2024  Losartan - 1/26/2024    Last appt: 3/25/2024   Next appt: Visit date not found    Last OARRS:       9/24/2021     9:25 AM   RX Monitoring   Chronic Pain > 50 MEDD Re-evaluated the status of the patient's underlying condition causing pain.

## 2024-06-12 ENCOUNTER — TELEPHONE (OUTPATIENT)
Dept: PRIMARY CARE CLINIC | Age: 64
End: 2024-06-12

## 2024-06-20 RX ORDER — AMLODIPINE BESYLATE 10 MG/1
TABLET ORAL
Qty: 90 TABLET | Refills: 0 | Status: SHIPPED | OUTPATIENT
Start: 2024-06-20

## 2024-06-20 NOTE — TELEPHONE ENCOUNTER
Medication:   Requested Prescriptions     Pending Prescriptions Disp Refills    amLODIPine (NORVASC) 10 MG tablet [Pharmacy Med Name: AMLODIPINE BESYLATE 10 MG Tablet] 90 tablet 3     Sig: TAKE 1 TABLET EVERY DAY     Last Filled:  1/26/24    Last appt: 3/25/2024   Next appt: Visit date not found    Left vm to schedule awv    Last OARRS:       9/24/2021     9:25 AM   RX Monitoring   Chronic Pain > 50 MEDD Re-evaluated the status of the patient's underlying condition causing pain.

## 2024-09-01 DIAGNOSIS — E78.00 HYPERCHOLESTEROLEMIA: ICD-10-CM

## 2024-09-03 RX ORDER — AMLODIPINE BESYLATE 10 MG/1
TABLET ORAL
Qty: 90 TABLET | Refills: 0 | Status: SHIPPED | OUTPATIENT
Start: 2024-09-03

## 2024-09-03 RX ORDER — LOSARTAN POTASSIUM 50 MG/1
TABLET ORAL
Qty: 90 TABLET | Refills: 0 | Status: SHIPPED | OUTPATIENT
Start: 2024-09-03

## 2024-09-03 RX ORDER — ATORVASTATIN CALCIUM 20 MG/1
TABLET, FILM COATED ORAL
Qty: 90 TABLET | Refills: 0 | Status: SHIPPED | OUTPATIENT
Start: 2024-09-03

## 2024-09-03 NOTE — TELEPHONE ENCOUNTER
Medication:   Requested Prescriptions     Pending Prescriptions Disp Refills    atorvastatin (LIPITOR) 20 MG tablet [Pharmacy Med Name: ATORVASTATIN CALCIUM 20 MG Tablet] 90 tablet 3     Sig: TAKE 1 TABLET EVERY DAY    losartan (COZAAR) 50 MG tablet [Pharmacy Med Name: LOSARTAN POTASSIUM 50 MG Tablet] 90 tablet 3     Sig: TAKE 1 TABLET EVERY DAY    amLODIPine (NORVASC) 10 MG tablet [Pharmacy Med Name: AMLODIPINE BESYLATE 10 MG Tablet] 90 tablet 3     Sig: TAKE 1 TABLET EVERY DAY     Last Filled: 6.20.24  4.8.24    Last appt: 3/25/2024   Next appt: 9/17/2024 NTP with Alma Delia    Last Lipid:   Lab Results   Component Value Date/Time    CHOL 165 06/01/2022 09:44 AM    TRIG 104 06/01/2022 09:44 AM    HDL 43 06/01/2022 09:44 AM

## 2024-09-18 ENCOUNTER — OFFICE VISIT (OUTPATIENT)
Dept: PRIMARY CARE CLINIC | Age: 64
End: 2024-09-18

## 2024-09-18 VITALS
TEMPERATURE: 97.4 F | HEART RATE: 103 BPM | DIASTOLIC BLOOD PRESSURE: 80 MMHG | WEIGHT: 190.2 LBS | OXYGEN SATURATION: 98 % | BODY MASS INDEX: 32.65 KG/M2 | SYSTOLIC BLOOD PRESSURE: 148 MMHG | RESPIRATION RATE: 16 BRPM

## 2024-09-18 DIAGNOSIS — Z12.11 SCREENING FOR COLON CANCER: ICD-10-CM

## 2024-09-18 DIAGNOSIS — Z12.5 PROSTATE CANCER SCREENING: ICD-10-CM

## 2024-09-18 DIAGNOSIS — Z72.0 TOBACCO USE: ICD-10-CM

## 2024-09-18 DIAGNOSIS — E78.00 HYPERCHOLESTEROLEMIA: Primary | ICD-10-CM

## 2024-09-18 DIAGNOSIS — Z13.1 SCREENING FOR DIABETES MELLITUS: ICD-10-CM

## 2024-09-18 DIAGNOSIS — Z23 NEEDS FLU SHOT: ICD-10-CM

## 2024-09-18 DIAGNOSIS — I10 PRIMARY HYPERTENSION: ICD-10-CM

## 2024-09-18 DIAGNOSIS — G47.33 OSA (OBSTRUCTIVE SLEEP APNEA): ICD-10-CM

## 2024-09-18 DIAGNOSIS — M45.7 ANKYLOSING SPONDYLITIS OF LUMBOSACRAL REGION (HCC): ICD-10-CM

## 2024-09-18 DIAGNOSIS — J42 CHRONIC BRONCHITIS, UNSPECIFIED CHRONIC BRONCHITIS TYPE (HCC): ICD-10-CM

## 2024-09-18 DIAGNOSIS — Z86.73 HISTORY OF CVA (CEREBROVASCULAR ACCIDENT): ICD-10-CM

## 2024-09-18 RX ORDER — PREGABALIN 75 MG/1
75 CAPSULE ORAL 2 TIMES DAILY
Qty: 180 CAPSULE | Refills: 0 | Status: SHIPPED | OUTPATIENT
Start: 2024-09-18 | End: 2025-03-17

## 2024-09-18 SDOH — ECONOMIC STABILITY: INCOME INSECURITY: HOW HARD IS IT FOR YOU TO PAY FOR THE VERY BASICS LIKE FOOD, HOUSING, MEDICAL CARE, AND HEATING?: NOT HARD AT ALL

## 2024-09-18 SDOH — ECONOMIC STABILITY: FOOD INSECURITY: WITHIN THE PAST 12 MONTHS, YOU WORRIED THAT YOUR FOOD WOULD RUN OUT BEFORE YOU GOT MONEY TO BUY MORE.: NEVER TRUE

## 2024-09-18 SDOH — ECONOMIC STABILITY: FOOD INSECURITY: WITHIN THE PAST 12 MONTHS, THE FOOD YOU BOUGHT JUST DIDN'T LAST AND YOU DIDN'T HAVE MONEY TO GET MORE.: NEVER TRUE

## 2024-09-18 ASSESSMENT — ENCOUNTER SYMPTOMS
SHORTNESS OF BREATH: 0
CHEST TIGHTNESS: 0
CONSTIPATION: 0
WHEEZING: 0
VOMITING: 0
BACK PAIN: 1
BLOOD IN STOOL: 0
SORE THROAT: 0
ABDOMINAL PAIN: 0
COUGH: 0
NAUSEA: 0
DIARRHEA: 0

## 2024-10-03 ENCOUNTER — TELEMEDICINE (OUTPATIENT)
Dept: PRIMARY CARE CLINIC | Age: 64
End: 2024-10-03

## 2024-10-03 DIAGNOSIS — Z87.891 PERSONAL HISTORY OF TOBACCO USE: ICD-10-CM

## 2024-10-03 DIAGNOSIS — Z00.00 MEDICARE ANNUAL WELLNESS VISIT, SUBSEQUENT: Primary | ICD-10-CM

## 2024-10-03 DIAGNOSIS — H91.91 HEARING PROBLEM OF RIGHT EAR: ICD-10-CM

## 2024-10-03 DIAGNOSIS — R26.89 BALANCE PROBLEM: ICD-10-CM

## 2024-10-03 NOTE — PATIENT INSTRUCTIONS
Your Everyday Guide\" from [employer]. Call 1-737.481.7137 or search [employer] online.  You need 5649-4758 mg of calcium and 4461-1732 IU of vitamin D per day. It is possible to meet your calcium requirement with diet alone, but a vitamin D supplement is usually necessary to meet this goal.  When exposed to the sun, use a sunscreen that protects against both UVA and UVB radiation with an SPF of 30 or greater. Reapply every 2 to 3 hours or after sweating, drying off with a towel, or swimming.  Always wear a seat belt when traveling in a car. Always wear a helmet when riding a bicycle or motorcycle.

## 2024-10-03 NOTE — PROGRESS NOTES
Medicare Annual Wellness Visit    Greg Felix is here for Medicare AWV    Assessment & Plan   Medicare annual wellness visit, subsequent  Personal history of tobacco use  -     HI VISIT TO DISCUSS LUNG CA SCREEN W LDCT  Recommendations for Preventive Services Due: see orders and patient instructions/AVS.  Recommended screening schedule for the next 5-10 years is provided to the patient in written form: see Patient Instructions/AVS.     No follow-ups on file.     Subjective       Patient's complete Health Risk Assessment and screening values have been reviewed and are found in Flowsheets. The following problems were reviewed today and where indicated follow up appointments were made and/or referrals ordered.    Positive Risk Factor Screenings with Interventions:    Fall Risk:  Do you feel unsteady or are you worried about falling? : (!) yes  2 or more falls in past year?: (!) yes  Fall with injury in past year?: no     Interventions:    Recommended Assisted Device  Referral: Physical Therapy (PT)              Poor Eating Habits/Diet:  Do you eat balanced/healthy meals regularly?: (!) No  Interventions:  Patient states him and wife are on fixed income. Expensive for food.   Gave information for meals on wheels.     Abnormal BMI (obese):  There is no height or weight on file to calculate BMI. (!) Abnormal  Interventions:  Patient declines any further evaluation or treatment        Dentist Screen:  Have you seen the dentist within the past year?: (!) No    Intervention:  Advised to schedule with their dentist  Patient will reach out to insurance and see who is covered and close to him.     Hearing Screen:  Do you or your family notice any trouble with your hearing that hasn't been managed with hearing aids?: (!) Yes    Interventions:  Referred to ENT  Patient states his right ear drains and it is hard to hear out of.       ADL's:   Patient reports needing help with:  Select all that apply: (!) Dressing,

## 2024-11-15 DIAGNOSIS — I10 PRIMARY HYPERTENSION: Primary | ICD-10-CM

## 2024-11-15 DIAGNOSIS — E78.00 HYPERCHOLESTEROLEMIA: ICD-10-CM

## 2024-11-15 RX ORDER — LOSARTAN POTASSIUM 50 MG/1
TABLET ORAL
Qty: 90 TABLET | Refills: 3 | Status: SHIPPED | OUTPATIENT
Start: 2024-11-15

## 2024-11-15 RX ORDER — ATORVASTATIN CALCIUM 20 MG/1
TABLET, FILM COATED ORAL
Qty: 90 TABLET | Refills: 3 | Status: SHIPPED | OUTPATIENT
Start: 2024-11-15

## 2024-11-15 RX ORDER — AMLODIPINE BESYLATE 10 MG/1
TABLET ORAL
Qty: 90 TABLET | Refills: 3 | Status: SHIPPED | OUTPATIENT
Start: 2024-11-15

## 2025-01-15 ENCOUNTER — TELEPHONE (OUTPATIENT)
Dept: PRIMARY CARE CLINIC | Age: 65
End: 2025-01-15

## 2025-05-25 NOTE — TELEPHONE ENCOUNTER
Medication:   Requested Prescriptions     Pending Prescriptions Disp Refills    atorvastatin (LIPITOR) 20 MG tablet [Pharmacy Med Name: Atorvastatin Calcium Oral Tablet 20 MG] 90 tablet 3     Sig: TAKE 1 TABLET EVERY DAY    losartan (COZAAR) 50 MG tablet [Pharmacy Med Name: Losartan Potassium Oral Tablet 50 MG] 90 tablet 3     Sig: TAKE 1 TABLET EVERY DAY    amLODIPine (NORVASC) 10 MG tablet [Pharmacy Med Name: amLODIPine Besylate Oral Tablet 10 MG] 90 tablet 3     Sig: TAKE 1 TABLET EVERY DAY     Last Filled:  9/3/2024    Last appt: 10/3/2024   Next appt: Visit date not found    Last Lipid:   Lab Results   Component Value Date/Time    CHOL 165 06/01/2022 09:44 AM    TRIG 104 06/01/2022 09:44 AM    HDL 43 06/01/2022 09:44 AM         
22:13

## 2025-09-03 DIAGNOSIS — E78.00 HYPERCHOLESTEROLEMIA: ICD-10-CM

## 2025-09-03 DIAGNOSIS — I10 PRIMARY HYPERTENSION: ICD-10-CM

## 2025-09-05 RX ORDER — AMLODIPINE BESYLATE 10 MG/1
10 TABLET ORAL DAILY
Qty: 90 TABLET | Refills: 0 | Status: SHIPPED | OUTPATIENT
Start: 2025-09-05

## 2025-09-05 RX ORDER — LOSARTAN POTASSIUM 50 MG/1
50 TABLET ORAL DAILY
Qty: 90 TABLET | Refills: 0 | Status: SHIPPED | OUTPATIENT
Start: 2025-09-05

## 2025-09-05 RX ORDER — ATORVASTATIN CALCIUM 20 MG/1
20 TABLET, FILM COATED ORAL DAILY
Qty: 90 TABLET | Refills: 0 | Status: SHIPPED | OUTPATIENT
Start: 2025-09-05